# Patient Record
Sex: MALE | Race: WHITE | Employment: OTHER | URBAN - METROPOLITAN AREA
[De-identification: names, ages, dates, MRNs, and addresses within clinical notes are randomized per-mention and may not be internally consistent; named-entity substitution may affect disease eponyms.]

---

## 2017-01-09 ENCOUNTER — PRE VISIT (OUTPATIENT)
Dept: UROLOGY | Facility: CLINIC | Age: 72
End: 2017-01-09

## 2017-01-16 ENCOUNTER — OFFICE VISIT (OUTPATIENT)
Dept: UROLOGY | Facility: CLINIC | Age: 72
End: 2017-01-16

## 2017-01-16 VITALS
HEIGHT: 69 IN | HEART RATE: 102 BPM | BODY MASS INDEX: 32.84 KG/M2 | WEIGHT: 221.7 LBS | DIASTOLIC BLOOD PRESSURE: 90 MMHG | SYSTOLIC BLOOD PRESSURE: 148 MMHG

## 2017-01-16 DIAGNOSIS — N52.01 ERECTILE DYSFUNCTION DUE TO ARTERIAL INSUFFICIENCY: Primary | ICD-10-CM

## 2017-01-16 DIAGNOSIS — N52.01 ERECTILE DYSFUNCTION DUE TO ARTERIAL INSUFFICIENCY: ICD-10-CM

## 2017-01-16 ASSESSMENT — PAIN SCALES - GENERAL: PAINLEVEL: NO PAIN (0)

## 2017-01-16 NOTE — PATIENT INSTRUCTIONS
Dr. Weathers will follow up with results of lab test to check the testosterone.    The maximum dose of the sildenafil is 100 mg.

## 2017-01-16 NOTE — NURSING NOTE
"    Chief Complaint   Patient presents with     RECHECK     Follow up of libido, urinary frequency, medication follow up, and discuss A1C       Blood pressure 148/90, pulse 102, height 1.753 m (5' 9\"), weight 100.562 kg (221 lb 11.2 oz). Body mass index is 32.72 kg/(m^2).    Patient Active Problem List   Diagnosis     ED (erectile dysfunction)     Sciatic pain     Diabetes mellitus, type 2 (H)       No Known Allergies    Current Outpatient Prescriptions   Medication Sig Dispense Refill     lisinopril (PRINIVIL,ZESTRIL) 10 MG tablet Take 1 tablet (10 mg) by mouth daily 90 tablet 3     insulin pen needle (BD CAMPBELL U/F) 32G X 4 MM Use daily. 100 each 3     insulin pen needle 32G X 4 MM Use daily. 100 each 11     liraglutide (VICTOZA PEN) 18 MG/3ML soln Inject 1.2 mg SQ daily. 15 mL 3     vardenafil (LEVITRA) 20 MG tablet Take 1 tablet 30 - 60 min prior to planned activity 30 tablet 3     aspirin 81 MG tablet        glipiZIDE (GLUCOTROL) 10 MG tablet        Multiple Vitamin (MULTI VITAMIN DAILY PO)        oxyCODONE-acetaminophen (PERCOCET)  MG per tablet        simvastatin (ZOCOR) 40 MG tablet        metFORMIN (GLUCOPHAGE) 1000 MG tablet Take 1 tablet twice a day - take with breakfast and supper. 60 tablet 11       Social History   Substance Use Topics     Smoking status: Former Smoker     Quit date: 10/01/2015     Smokeless tobacco: Not on file     Alcohol Use: Not on file       ANAIS Dumont  1/16/2017  10:07 AM       "

## 2017-01-16 NOTE — MR AVS SNAPSHOT
After Visit Summary   1/16/2017    Diego Velarde    MRN: [id number]           Patient Information     Date Of Birth          1945        Visit Information        Provider Department      1/16/2017 10:20 AM Dieudonne Weathers MD [facility] Urology and [facility] for Prostate and Urologic Cancers        Today's Diagnoses     Erectile dysfunction due to arterial insufficiency    -  1       Care Instructions    Dr. Weathers will follow up with results of lab test to check the testosterone.    The maximum dose of the sildenafil is 100 mg.            Follow-ups after your visit        Follow-up notes from your care team     Return if symptoms worsen or fail to improve.      Your next 10 appointments already scheduled     Jan 16, 2017 11:15 AM   LAB with  LAB   [facility] Lab ([facility])    [address] [place] [address]  [address] [facility] [id number] [phone]           Patient must bring picture ID.  Patient should be prepared to give a urine specimen  Please do not eat 10-12 hours before your appointment if you are coming in fasting for labs on lipids, cholesterol, or glucose (sugar).  Pregnant women should follow their Care Team instructions. Water with medications is okay. Do not drink coffee or other fluids.   If you have concerns about taking  your medications, please ask at office or if scheduling via [employer], send a message by clicking on Secure Messaging, Message Your Care Team.            Feb 07, 2017  1:20 PM   (Arrive by 1:05 PM)   Return Visit with  Uro Procedure Nurse   [facility] Urology and Inst for Prostate and Urologic Cancers ([facility])    [address] [place] [address]  [address] [facility] [id number] [phone]            Feb 15, 2017  9:00 AM   (Arrive by 8:30 AM)   RETURN DIABETES with Kim Almeida PA-C   [facility] Endocrinology ([facility])    [address] [place] [address]  [address] [facility]  [id number] [phone]              Future tests that were ordered for you today     Open Future Orders        Priority Expected Expires Ordered    Testosterone total Routine  1/16/2018 1/16/2017            Who to contact     Please call your clinic at [phone] to:    Ask questions about your health    Make or cancel appointments    Discuss your medicines    Learn about your test results    Speak to your doctor   If you have compliments or concerns about an experience at your clinic, or if you wish to file a complaint, please contact [facility] Patient Relations at [phone] or email us at [email or web address]         Additional Information About Your Visit        [employer] Information     [employer] gives you secure access to your electronic health record. If you see a primary care provider, you can also send messages to your care team and make appointments. If you have questions, please call your primary care clinic.  If you do not have a primary care provider, please call [phone] and they will assist you.      [employer] is an electronic gateway that provides easy, online access to your medical records. With [employer], you can request a clinic appointment, read your test results, renew a prescription or communicate with your care team.     To access your existing account, please contact your [facility] or call [phone] for assistance.        Care EveryWhere ID     This is your Care EveryWhere ID. This could be used by other organizations to access your [facility] medical records  [id number]        Your Vitals Were     Pulse Height BMI (Body Mass Index)             102 1.753 m (5' 9\") 32.72 kg/m2          Blood Pressure from Last 3 Encounters:   01/16/17 148/90   11/17/16 130/75   08/11/16 152/89    Weight from Last 3 Encounters:   01/16/17 100.562 kg (221 lb 11.2 oz)   11/17/16 101.016 kg (222 lb 11.2 oz)   08/11/16 99.791 kg (220 lb) "               Primary Care Provider    None Specified       No primary provider on file.        Thank you!     Thank you for choosing [facility] UROLOGY AND [facility] FOR PROSTATE AND UROLOGIC CANCERS  for your care. Our goal is always to provide you with excellent care. Hearing back from our patients is one way we can continue to improve our services. Please take a few minutes to complete the written survey that you may receive in the mail after your visit with us. Thank you!             Your Updated Medication List - Protect others around you: Learn how to safely use, store and throw away your medicines at www.disposemymeds.org.          This list is accurate as of: 1/16/17 10:40 AM.  Always use your most recent med list.                   Brand Name Dispense Instructions for use    aspirin 81 MG tablet          glipiZIDE 10 MG tablet    GLUCOTROL         * insulin pen needle 32G X 4 MM     100 each    Use daily.       * insulin pen needle 32G X 4 MM    BD CAMPBELL U/F    100 each    Use daily.       lisinopril 10 MG tablet    PRINIVIL/ZESTRIL    90 tablet    Take 1 tablet (10 mg) by mouth daily       metFORMIN 1000 MG tablet    GLUCOPHAGE    60 tablet    Take 1 tablet twice a day - take with breakfast and supper.       MULTI VITAMIN DAILY PO          oxyCODONE-acetaminophen  MG per tablet    PERCOCET         simvastatin 40 MG tablet    ZOCOR         vardenafil 20 MG tablet    LEVITRA    30 tablet    Take 1 tablet 30 - 60 min prior to planned activity       VICTOZA PEN 18 MG/3ML soln   Generic drug:  liraglutide     15 mL    Inject 1.2 mg SQ daily.       * Notice:  This list has 2 medication(s) that are the same as other medications prescribed for you. Read the directions carefully, and ask your doctor or other care provider to review them with you.

## 2017-01-16 NOTE — Clinical Note
1/16/2017       RE: Diego Velarde  [address] [place] [address]     Dear Colleague,    Thank you for referring your patient, Diego Velarde, to the [facility] UROLOGY AND INST FOR PROSTATE AND UROLOGIC CANCERS at [facility]. Please see a copy of my visit note below.    DIAGNOSIS:  A 71-year-old man with erectile dysfunction.      HISTORY OF PRESENT ILLNESS:  Mr. Velarde is here for a followup visit.  I had seen him initially back in May of last year for erectile dysfunction and type 2 diabetes.  Subsequently, we had followup with Kim Almeida in our Diabetes Clinic for his diabetes.  With regard to his erectile dysfunction when I had seen him last May, we had given him a prescription for Cialis 20 mg to try.  In the interim, he also had contacted us and wanted to try Levitra, so we had sent a prescription for Levitra at the 20 mg dose as well to try.      He returns to follow up with several issues.  He feels his libido is decreased.  He notes he has always been quite sexually active and he is concerned that his desire is not as strong as in the past.  He is  and his wife is supportive, but he wants to be able to have an active sexual life if possible.  He also feels his energy is somewhat decreased overall.  He does note that he retired since we last saw him, but he is trying to be quite active, going to a gym regularly to work including doing some weight training.      He has also noted some urinary frequency, he may get up 2-3 times a night to urinate and he goes frequently during the day.  He reports no straining with urination.  He does note that he tends to drink a lot of fluid including coffee during the day.      With regard to his erectile dysfunction, he is using phosphodiesterase inhibitors somewhat intermittently.  It sounds like he had a prescription for generic sildenafil, which he has been trying and he was wondering about the maximum dose of  this.  He is not sure of the dose of the pills he has right now but he has been using about 1-1/2 pills at a time and he thinks they might be the 20 mg dose.  It sounds like the phosphodiesterase works somewhat variably and are not always adequate.  He notes that if he takes higher doses of them sometimes he will get some backache and heartburn.  However, this would generally be tolerable if they were effective.      Otherwise, he feels pretty well.  He does not describe any exertional chest pain or anginal symptoms.  Appetite is good.  He does take oxycodone twice a day for chronic back pain.        His diabetes has been in good control, working with Kim Almeida, his last A1c was 6.7%.      MEDICATION:  His current medications are lisinopril.  He is taking 5 mg tablets Kim recommended he cut 10 mg but apparently he did not change yet.  Liraglutide at lag 1.2 mg subcu daily.  Glipizide 10 mg daily.   Oxycodone twice a day as above, simvastatin 40 mg daily, Metformin 1000 mg twice a day.      REVIEW OF SYSTEMS:  As above, otherwise no acute cardiac, respiratory or other GI complaints.  He does have some sensation of decreased feeling in the lower extremities bilaterally.      PHYSICAL EXAM:     GENERAL:  He appears well.  He appears normally virilized.     VITAL SIGNS:  Weight is 221 pounds, little change since the last time I saw him last May, pulse 102, blood pressure 148/90.   HEENT:  Visual fields are full to confrontation.   NECK:  There is no thyromegaly.   BREASTS:  He has no gynecomastia.  He has normal hair distribution in a male pattern.   CHEST:  Clear.   CARDIAC:  Regular rate and rhythm, normal heart sounds.  Femoral pulses are intact without bruits.   GENITALIA:  Penis is circumcised, normally developed, there is no plaque palpable.  Testes are 15 mL bilaterally, normal size and consistency.  Normal anal sphincter tone.  Prostate feels smooth, nontender, no mass noted.  Does not feel significantly  enlarged.   EXTREMITIES:  He has no edema.      LABORATORY TESTS:  We reviewed his recent records.  Laboratory tests last August showed a creatinine 0.84.  Normal liver function tests normal.  TSH of 1.02, last hemoglobin A1c in November was 6.7%.      ASSESSMENT:  A 71-year-old man with erectile dysfunction.  The patient has multiple risk factors including age, hypertension, type 2 diabetes.  The patient also now complains of low libido.  He is having some lower urinary tract symptoms of urinary frequency.      I discussed with Mr. Velarde that we can certainly check a testosterone level.  Overall, he appears normally virilized, so my suspicion that he has severe hypogonadism as the etiology of his low libido is low.  Other more non-specific symptoms such as general decrease in energy are likely not related to his testosterone status.      With regard to his erectile dysfunction, we discussed options.  I told him the maximum dose of the sildenafil would be 100 mg so he could gradually go up to the maximum dose if necessary.  We discussed other options such as vacuum device or penile injection therapy.  He would be potentially interested in a vacuum device.  We did discuss that this is the most cost effective in the long run.      With regard to his lower urinary tract symptoms his prostate does not feel significantly enlarged.  I reassured him about this.  I did discuss that there were medications that can be used, which might help the nocturia, however at this point he would be fine just trying conservative measures such as limiting the amount of fluid that he takes it later in the evening.      PLAN:  After discussion, we agreed on the following plan.   1.  We are going to check a testosterone level today just to rule out significant hypogonadism that may be contributing to his symptoms, particularly the low libido.   2.  We will schedule him to come in and meet with a representative from a company that makes the  vacuum device to get information and a potential trial of the vacuum erection device.   3.  I wrote for him the maximum dose of the sildenafil so he can have that to refer to if he wants to increase his dose at home.   4.  I recommended that he go ahead and increase the lisinopril up to 10 mg as Kim Almeida had recommended.      We will follow up with him on the results testosterone level.  We will plan to see him back as necessary in the future depending on the results of his evaluation above.           Again, thank you for allowing me to participate in the care of your patient.      Sincerely,    Dieudonne Weathers MD

## 2017-01-16 NOTE — PROGRESS NOTES
DIAGNOSIS:  A 71-year-old man with erectile dysfunction.      HISTORY OF PRESENT ILLNESS:  Mr. Velarde is here for a followup visit.  I had seen him initially back in May of last year for erectile dysfunction and type 2 diabetes.  Subsequently, we had followup with Kim Almeida in our Diabetes Clinic for his diabetes.  With regard to his erectile dysfunction when I had seen him last May, we had given him a prescription for Cialis 20 mg to try.  In the interim, he also had contacted us and wanted to try Levitra, so we had sent a prescription for Levitra at the 20 mg dose as well to try.      He returns to follow up with several issues.  He feels his libido is decreased.  He notes he has always been quite sexually active and he is concerned that his desire is not as strong as in the past.  He is  and his wife is supportive, but he wants to be able to have an active sexual life if possible.  He also feels his energy is somewhat decreased overall.  He does note that he retired since we last saw him, but he is trying to be quite active, going to a gym regularly to work including doing some weight training.      He has also noted some urinary frequency, he may get up 2-3 times a night to urinate and he goes frequently during the day.  He reports no straining with urination.  He does note that he tends to drink a lot of fluid including coffee during the day.      With regard to his erectile dysfunction, he is using phosphodiesterase inhibitors somewhat intermittently.  It sounds like he had a prescription for generic sildenafil, which he has been trying and he was wondering about the maximum dose of this.  He is not sure of the dose of the pills he has right now but he has been using about 1-1/2 pills at a time and he thinks they might be the 20 mg dose.  It sounds like the phosphodiesterase works somewhat variably and are not always adequate.  He notes that if he takes higher doses of them sometimes he will get some  backache and heartburn.  However, this would generally be tolerable if they were effective.      Otherwise, he feels pretty well.  He does not describe any exertional chest pain or anginal symptoms.  Appetite is good.  He does take oxycodone twice a day for chronic back pain.        His diabetes has been in good control, working with Kim Almeida, his last A1c was 6.7%.      MEDICATION:  His current medications are lisinopril.  He is taking 5 mg tablets Kim recommended he cut 10 mg but apparently he did not change yet.  Liraglutide at lag 1.2 mg subcu daily.  Glipizide 10 mg daily.   Oxycodone twice a day as above, simvastatin 40 mg daily, Metformin 1000 mg twice a day.      REVIEW OF SYSTEMS:  As above, otherwise no acute cardiac, respiratory or other GI complaints.  He does have some sensation of decreased feeling in the lower extremities bilaterally.      PHYSICAL EXAM:     GENERAL:  He appears well.  He appears normally virilized.     VITAL SIGNS:  Weight is 221 pounds, little change since the last time I saw him last May, pulse 102, blood pressure 148/90.   HEENT:  Visual fields are full to confrontation.   NECK:  There is no thyromegaly.   BREASTS:  He has no gynecomastia.  He has normal hair distribution in a male pattern.   CHEST:  Clear.   CARDIAC:  Regular rate and rhythm, normal heart sounds.  Femoral pulses are intact without bruits.   GENITALIA:  Penis is circumcised, normally developed, there is no plaque palpable.  Testes are 15 mL bilaterally, normal size and consistency.  Normal anal sphincter tone.  Prostate feels smooth, nontender, no mass noted.  Does not feel significantly enlarged.   EXTREMITIES:  He has no edema.      LABORATORY TESTS:  We reviewed his recent records.  Laboratory tests last August showed a creatinine 0.84.  Normal liver function tests normal.  TSH of 1.02, last hemoglobin A1c in November was 6.7%.      ASSESSMENT:  A 71-year-old man with erectile dysfunction.  The patient  has multiple risk factors including age, hypertension, type 2 diabetes.  The patient also now complains of low libido.  He is having some lower urinary tract symptoms of urinary frequency.      I discussed with Mr. Velarde that we can certainly check a testosterone level.  Overall, he appears normally virilized, so my suspicion that he has severe hypogonadism as the etiology of his low libido is low.  Other more non-specific symptoms such as general decrease in energy are likely not related to his testosterone status.      With regard to his erectile dysfunction, we discussed options.  I told him the maximum dose of the sildenafil would be 100 mg so he could gradually go up to the maximum dose if necessary.  We discussed other options such as vacuum device or penile injection therapy.  He would be potentially interested in a vacuum device.  We did discuss that this is the most cost effective in the long run.      With regard to his lower urinary tract symptoms his prostate does not feel significantly enlarged.  I reassured him about this.  I did discuss that there were medications that can be used, which might help the nocturia, however at this point he would be fine just trying conservative measures such as limiting the amount of fluid that he takes it later in the evening.      PLAN:  After discussion, we agreed on the following plan.   1.  We are going to check a testosterone level today just to rule out significant hypogonadism that may be contributing to his symptoms, particularly the low libido.   2.  We will schedule him to come in and meet with a representative from a company that makes the vacuum device to get information and a potential trial of the vacuum erection device.   3.  I wrote for him the maximum dose of the sildenafil so he can have that to refer to if he wants to increase his dose at home.   4.  I recommended that he go ahead and increase the lisinopril up to 10 mg as Kim Almeida had  recommended.      We will follow up with him on the results testosterone level.  We will plan to see him back as necessary in the future depending on the results of his evaluation above.

## 2017-01-18 LAB — TESTOST SERPL-MCNC: 208 NG/DL (ref 240–950)

## 2017-01-23 ENCOUNTER — MYC MEDICAL ADVICE (OUTPATIENT)
Dept: UROLOGY | Facility: CLINIC | Age: 72
End: 2017-01-23

## 2017-01-23 DIAGNOSIS — E29.1 HYPOGONADISM MALE: ICD-10-CM

## 2017-01-23 DIAGNOSIS — R68.82 LOW LIBIDO: ICD-10-CM

## 2017-01-23 DIAGNOSIS — R89.9 ABNORMAL LABORATORY TEST: Primary | ICD-10-CM

## 2017-01-24 ENCOUNTER — MYC MEDICAL ADVICE (OUTPATIENT)
Dept: UROLOGY | Facility: CLINIC | Age: 72
End: 2017-01-24

## 2017-01-26 DIAGNOSIS — E29.1 HYPOGONADISM MALE: ICD-10-CM

## 2017-01-26 DIAGNOSIS — R68.82 LOW LIBIDO: ICD-10-CM

## 2017-01-26 DIAGNOSIS — R89.9 ABNORMAL LABORATORY TEST: ICD-10-CM

## 2017-01-26 LAB
FERRITIN SERPL-MCNC: 255 NG/ML (ref 26–388)
LH SERPL-ACNC: 9.8 IU/L (ref 3.1–34.6)
PROLACTIN SERPL-MCNC: 6 UG/L (ref 2–18)
SHBG SERPL-SCNC: 20 NMOL/L (ref 11–80)

## 2017-01-29 LAB — TESTOST SERPL-MCNC: 197 NG/DL (ref 240–950)

## 2017-02-03 ENCOUNTER — TELEPHONE (OUTPATIENT)
Dept: UROLOGY | Facility: CLINIC | Age: 72
End: 2017-02-03

## 2017-02-03 NOTE — TELEPHONE ENCOUNTER
----- Message from Jaclyn Cortez sent at 2/3/2017  9:30 AM CST -----  Patient scheduled and LM to confirm  ----- Message -----     From: Kasia Gustafson, DEBRA     Sent: 2/2/2017   3:57 PM       To: Clinic Coordinators-[facility]    Please schedule this pt to see Dr Weathers in a few weeks     an add on to the end of my clinic if nec.    Thanks Br    ----- Message -----     From: Dieudonne Weathers MD     Sent: 2/1/2017   9:10 AM       To: Kasia Gustafson, RN      Can we set him up to see me in the next couple of weeks - can add on to the end of my clinic if nec.  Thanks Br

## 2017-02-15 ENCOUNTER — PRE VISIT (OUTPATIENT)
Dept: ORTHOPEDICS | Facility: CLINIC | Age: 72
End: 2017-02-15

## 2017-02-15 ENCOUNTER — OFFICE VISIT (OUTPATIENT)
Dept: ENDOCRINOLOGY | Facility: CLINIC | Age: 72
End: 2017-02-15

## 2017-02-15 VITALS
WEIGHT: 226.2 LBS | HEART RATE: 76 BPM | SYSTOLIC BLOOD PRESSURE: 172 MMHG | HEIGHT: 69 IN | DIASTOLIC BLOOD PRESSURE: 92 MMHG | BODY MASS INDEX: 33.5 KG/M2

## 2017-02-15 DIAGNOSIS — Z79.4 TYPE 2 DIABETES MELLITUS WITH HYPERGLYCEMIA, WITH LONG-TERM CURRENT USE OF INSULIN (H): ICD-10-CM

## 2017-02-15 DIAGNOSIS — E11.65 TYPE 2 DIABETES MELLITUS WITH HYPERGLYCEMIA, WITH LONG-TERM CURRENT USE OF INSULIN (H): ICD-10-CM

## 2017-02-15 DIAGNOSIS — E11.65 TYPE 2 DIABETES MELLITUS WITH HYPERGLYCEMIA, WITH LONG-TERM CURRENT USE OF INSULIN (H): Primary | ICD-10-CM

## 2017-02-15 DIAGNOSIS — Z79.4 TYPE 2 DIABETES MELLITUS WITH HYPERGLYCEMIA, WITH LONG-TERM CURRENT USE OF INSULIN (H): Primary | ICD-10-CM

## 2017-02-15 LAB
ALT SERPL W P-5'-P-CCNC: 42 U/L (ref 0–70)
CHOLEST SERPL-MCNC: 109 MG/DL
CREAT UR-MCNC: 46 MG/DL
HBA1C MFR BLD: 7.1 % (ref 4.3–6)
HDLC SERPL-MCNC: 32 MG/DL
LDLC SERPL CALC-MCNC: 6 MG/DL
MICROALBUMIN UR-MCNC: 33 MG/L
MICROALBUMIN/CREAT UR: 71.71 MG/G CR (ref 0–17)
NONHDLC SERPL-MCNC: 77 MG/DL
POTASSIUM SERPL-SCNC: 4.3 MMOL/L (ref 3.4–5.3)
T4 FREE SERPL-MCNC: 0.92 NG/DL (ref 0.76–1.46)
TRIGL SERPL-MCNC: 358 MG/DL
TSH SERPL DL<=0.05 MIU/L-ACNC: 1.36 MU/L (ref 0.4–4)

## 2017-02-15 RX ORDER — PREGABALIN 50 MG/1
50 CAPSULE ORAL DAILY
Qty: 90 CAPSULE | Refills: 1 | Status: SHIPPED | OUTPATIENT
Start: 2017-02-15 | End: 2017-04-19

## 2017-02-15 RX ORDER — LIRAGLUTIDE 6 MG/ML
1.8 INJECTION SUBCUTANEOUS DAILY
Qty: 15 ML | Refills: 3 | Status: SHIPPED | OUTPATIENT
Start: 2017-02-15 | End: 2017-06-22

## 2017-02-15 NOTE — MR AVS SNAPSHOT
After Visit Summary   2/15/2017    Diego Velarde    MRN: [id number]           Patient Information     Date Of Birth          1945        Visit Information        Provider Department      2/15/2017 9:00 AM Kim Almeida PA-C [facility]        Today's Diagnoses     Type 2 diabetes mellitus with hyperglycemia, with long-term current use of insulin (H)    -  1      Care Instructions    1. Purchase a blood pressure kit at [facility] with cuff and check your BP at home in the am before working out and once in the evening.  Send me BP readings via [employer] next Wednesday.  2.  Increase Victoza 1.8 mg SQ daily.  3.  Continue Metformin and Glucotrol.  4.  See Podiatrist.  5.  See me in 2 months.  Kim Almeida PA-C         Follow-ups after your visit        Additional Services     PODIATRY/FOOT & ANKLE SURGERY REFERRAL       Your provider has referred you to: PREFERRED PROVIDERS:  Please schedule pt to see Podiatry for foot/nail care.    Please be aware that coverage of these services is subject to the terms and limitations of your health insurance plan.  Call member services at your health plan with any benefit or coverage questions.      Please bring the following to your appointment:  >>   Any x-rays, CTs or MRIs which have been performed.  Contact the facility where they were done to arrange for  prior to your scheduled appointment.    >>   List of current medications   >>   This referral request   >>   Any documents/labs given to you for this referral                  Your next 10 appointments already scheduled     Feb 27, 2017 10:40 AM CST   (Arrive by 10:25 AM)   Return Visit with Dieudonne Weathers MD   [facility]rologic C[facility] ([facility])    [address] [place] [id number] [phone]            Apr 19, 2017 10:40 AM CDT   (Arrive by 10:25 AM)   NEW FOOT/ANKLE with Rodríguez Serna  ANAT Simmons   [facility] ([facility] [place])    [address]  4th Floor  [facility] [address] [phone]            Apr 19, 2017 11:30 AM CDT   (Arrive by 11:15 AM)   RETURN DIABETES with Kim Almeida PA-C   [facility] ([facility])    [address]  3rd Floor  [facility] [address] [phone]              Future tests that were ordered for you today     Open Future Orders        Priority Expected Expires Ordered    Creatinine Routine  2/15/2018 2/15/2017            Who to contact     Please call your clinic at [phone] to:    Ask questions about your health    Make or cancel appointments    Discuss your medicines    Learn about your test results    Speak to your doctor   If you have compliments or concerns about an experience at your clinic, or if you wish to file a complaint, please contact [facility] Patient Relations at [phone] or email us at [email or web address]         Additional Information About Your Visit        [employer] Information     [email or web address] gives you secure access to your electronic health record. If you see a primary care provider, you can also send messages to your care team and make appointments. If you have questions, please call your primary care clinic.  If you do not have a primary care provider, please call [phone] and they will assist you.      EyeVerify is an electronic gateway that provides easy, online access to your medical records. With EyeVerify, you can request a clinic appointment, read your test results, renew a prescription or communicate with your care team.     To access your existing account, please contact your [facility] or call [phone] for assistance.        Care EveryWhere ID     This is your Care EveryWhere ID. This could be used by other organizations to access your [facility]  "records  [id number]        Your Vitals Were     Pulse Height BMI (Body Mass Index)             76 1.753 m (5' 9\") 33.4 kg/m2          Blood Pressure from Last 3 Encounters:   02/15/17 (P) 159/82   01/16/17 148/90   11/17/16 130/75    Weight from Last 3 Encounters:   02/15/17 102.6 kg (226 lb 3.2 oz)   01/16/17 100.6 kg (221 lb 11.2 oz)   11/17/16 101 kg (222 lb 11.2 oz)              We Performed the Following     Albumin Random Urine Quantitative     Hemoglobin A1c POCT     PODIATRY/FOOT & ANKLE SURGERY REFERRAL          Today's Medication Changes          These changes are accurate as of: 2/15/17  4:47 PM.  If you have any questions, ask your nurse or doctor.               Start taking these medicines.        Dose/Directions    pregabalin 50 MG capsule   Commonly known as:  LYRICA   Used for:  Type 2 diabetes mellitus with hyperglycemia, with long-term current use of insulin (H)   Started by:  Kim Almeida PA-C        Dose:  50 mg   Take 1 capsule (50 mg) by mouth daily   Quantity:  90 capsule   Refills:  1         These medicines have changed or have updated prescriptions.        Dose/Directions    liraglutide 18 MG/3ML soln   Commonly known as:  VICTOZA PEN   This may have changed:    - how much to take  - how to take this  - when to take this  - additional instructions   Used for:  Type 2 diabetes mellitus with hyperglycemia, with long-term current use of insulin (H)   Changed by:  Kim Almeida PA-C        Dose:  1.8 mg   Inject 1.8 mg Subcutaneous daily   Quantity:  15 mL   Refills:  3            Where to get your medicines      These medications were sent to [facility]/pharmacy #[id number] [place] [address] [address] [address]     Phone:  [phone]     liraglutide 18 MG/3ML soln         Some of these will need a paper prescription and others can be bought over the counter.  Ask your nurse if you have questions.     Bring a paper prescription for each " of these medications     pregabalin 50 MG capsule                Primary Care Provider Office Phone # Fax #    Sharmila Sauceda [phone]       [facility] [address] [facility] [address]        Thank you!     Thank you for choosing [facility]  for your care. Our goal is always to provide you with excellent care. Hearing back from our patients is one way we can continue to improve our services. Please take a few minutes to complete the written survey that you may receive in the mail after your visit with us. Thank you!             Your Updated Medication List - Protect others around you: Learn how to safely use, store and throw away your medicines at www.disposemymeds.org.          This list is accurate as of: 2/15/17  4:47 PM.  Always use your most recent med list.                   Brand Name Dispense Instructions for use    aspirin 81 MG tablet          glipiZIDE 10 MG tablet    GLUCOTROL         * insulin pen needle 32G X 4 MM     100 each    Use daily.       * insulin pen needle 32G X 4 MM    BD CAMPBELL U/F    100 each    Use daily.       liraglutide 18 MG/3ML soln    VICTOZA PEN    15 mL    Inject 1.8 mg Subcutaneous daily       lisinopril 10 MG tablet    PRINIVIL/ZESTRIL    90 tablet    Take 1 tablet (10 mg) by mouth daily       metFORMIN 1000 MG tablet    GLUCOPHAGE    60 tablet    Take 1 tablet twice a day - take with breakfast and supper.       MULTI VITAMIN DAILY PO          oxyCODONE-acetaminophen  MG per tablet    PERCOCET         pregabalin 50 MG capsule    LYRICA    90 capsule    Take 1 capsule (50 mg) by mouth daily       simvastatin 40 MG tablet    ZOCOR         vardenafil 20 MG tablet    LEVITRA    30 tablet    Take 1 tablet 30 - 60 min prior to planned activity       * Notice:  This list has 2 medication(s) that are the same as other medications prescribed for you. Read the directions carefully, and ask your doctor or other care provider to review  them with you.

## 2017-02-15 NOTE — PATIENT INSTRUCTIONS
1. Purchase a blood pressure kit at [facility] with cuff and check your BP at home in the am before working out and once in the evening.  Send me BP readings via [employer] next Wednesday.  2.  Increase Victoza 1.8 mg SQ daily.  3.  Continue Metformin and Glucotrol.  4.  See Podiatrist.  5.  See me in 2 months.  Kim Almeida PA-C

## 2017-02-15 NOTE — NURSING NOTE
"Chief Complaint   Patient presents with     RECHECK     f/u DM type 2        Initial BP (!) 172/92 (BP Location: Right arm, Patient Position: Chair, Cuff Size: Adult Large)  Pulse 76  Ht 1.753 m (5' 9\")  Wt 102.6 kg (226 lb 3.2 oz)  BMI 33.4 kg/m2 Estimated body mass index is 33.4 kg/(m^2) as calculated from the following:    Height as of this encounter: 1.753 m (5' 9\").    Weight as of this encounter: 102.6 kg (226 lb 3.2 oz).  Medication Reconciliation: complete         Sophy Starr, YAMILE     "

## 2017-02-15 NOTE — PROGRESS NOTES
HPI  Diego Tony Justin Velarde is a 71 year old male with type 2 diabetes mellitus here today for a follow up visit.  He was dx having type 2 diabetes 5 + years ago.  His diabetes is complicated by neuropathy and nephropathy.  No hx of retinopathy.  For his diabetes, he is currently taking Glucotrol 10 mg 2 tabs in pm,  Metformin 1000 mg BID and Victoza 1.2 mg SQ daily.  I started pt on Victoza on 7/22/2016 and he increased his Victoza dose 1.2 mg SQ daily on 7/29/2016.  He did not tolerate he higher dose of Victoza 1.8 mg daily.  His A1C is 7.1 % today.  His previous A1C was 6.7 %.    His  A1C was 8.1 % in May 2016.  We downloaded his glucose meter today and his fasting blood sugar values are higher.  His average glucose is 161 with SD 39 over the past month.  Several of his FBS values are in the mid-high 100 range.  No hypoglycemia.  On ROS today, numbness in his toes. No tingling or pain in his feet.  He has chronic back pain.  Pt denies visual problems.  He denies n/v, SOB at rest, cough, chest pain, abd pain or diarrhea.  No dysuria, hematuria or foot ulcers.    Diabetes Care  Retinopathy:none; pt seen by Oph in Dec 2016.  Nephropathy:yes; pt's urine microalbuminuria was + in 8/2016.  He is taking Lisinopril.  Neuropathy:yes.   Foot Exam:no ulcers; abnormal monofilamentous exam.  Taking aspirin:yes.  Lipids:LDL was 6 on 2/15/2017.Pt is taking Simvastatin.    ROS  Please see under HPI.    Allergies  No Known Allergies    Medications  Current Outpatient Prescriptions   Medication Sig Dispense Refill     liraglutide (VICTOZA PEN) 18 MG/3ML soln Inject 1.8 mg Subcutaneous daily 15 mL 3     pregabalin (LYRICA) 50 MG capsule Take 1 capsule (50 mg) by mouth daily 90 capsule 1     lisinopril (PRINIVIL,ZESTRIL) 10 MG tablet Take 1 tablet (10 mg) by mouth daily 90 tablet 3     insulin pen needle (BD CAMPBELL U/F) 32G X 4 MM Use daily. 100 each 3     insulin pen needle 32G X 4 MM Use daily. 100 each 11     vardenafil (LEVITRA)  "20 MG tablet Take 1 tablet 30 - 60 min prior to planned activity 30 tablet 3     aspirin 81 MG tablet        glipiZIDE (GLUCOTROL) 10 MG tablet        Multiple Vitamin (MULTI VITAMIN DAILY PO)        oxyCODONE-acetaminophen (PERCOCET)  MG per tablet        simvastatin (ZOCOR) 40 MG tablet        metFORMIN (GLUCOPHAGE) 1000 MG tablet Take 1 tablet twice a day - take with breakfast and supper. 60 tablet 11     [DISCONTINUED] liraglutide (VICTOZA PEN) 18 MG/3ML soln Inject 1.2 mg SQ daily. 15 mL 3     Family History  Father had type 2 diabetes.  He also has a brother with type 2 diabetes.    Social History  Smoke: none at this time; he quit smoking fall of 2015.  ETOH: rare.  .  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    Past Medical History  1.  Type 2 diabetes mellitus dx 5 + years ago.  2.  Neuropathy.  3.  S/P bilateral knee replacement.  4.  S/P left salivary cyst removed.  5.  S/P hernia surgery.  6.  Diverticulitis.  7.  HTN.  8. Hyperlipidemia.    Physical Exam  BP (P) 159/82  Pulse 76  Ht 1.753 m (5' 9\")  Wt 102.6 kg (226 lb 3.2 oz)  BMI 33.4 kg/m2  Body mass index is 33.4 kg/(m^2).    GENERAL : In no apparent distress  HEENT: PERRLA; fundi not examined.  NECK: No goiter.  LUNGS: Clear b/l.  CARDIAC: RRR.  ABDOMEN: Nontender.  EXTREMITIES: No edema.    FEET: No ulcers; hammertoes; abnormal monofilamentous exam.    RESULTS  A1C    7.1   2/15/2017  A1C    6.7   11/17/2016  A1C    7.2   8/11/2016  A1C    8.1 %  5/2016      ASSESSMENT/PLAN:    1.  TYPE 2 DIABETES MELLITUS:  Justin's A1C is higher today and his FBS values are above target.  I asked him to increase his Victoza 1.8 mg SQ daily.  If he develops n/v or abdominal pain he is to notify me.  Cristobal, I reviewed how Victoza works and possible side effects of the meds including n/v, GI distress, diarrhea, headaches, hypoglycemia and rare risk of pancreatitis/cancer and thyroid cancer.  He is to remain on Glucotrol and Metformin and if " he has any blood sugar values 70 or less to notify us.  Instructed pt to check his blood sugar premeals ( 3 times per day ) DAILY.  He was seen by Oph in 11/2016 without any evidence of retinopathy.  He is taking ASA daily.  TSH normal today.  Justin had the flu vaccine this season.    2. NEUROPATHY: Numbness in both feet. He tried Gabapentin which he states did not help.  I sent a RX for Lyrica to his pharmacy today.    3.  HTN: BP too high. He is taking Lisinopril 10 mg daily.  I asked him to purchase a BP cuff and check his BP each am prior to exercise and in the evening and to send me the BP readings via [employer] in 1 week.    4.  HYPERLIPIDEMIA: LDL 6 today on Simvastatin.  His TG were elevated today.    5.  OVERWEIGHT: Encouraged pt to make healthy food choices, reduce food portions with meals and remain active.  He has lost some weight.  Increasing Victoza dose 1.8 mg SQ daily.    6. ED: Pt to see Dr. Weathers in f/u next week.     7.  ED: Improved with better glycemic control.    8.  FOOT CARE: Referred to Podiatry here.  Pt has hammertoes and neuropathy.  He may benefit from being fitted with diabetic shoes/inserts.    9.  Return to Endocrine Clinic to see me in 2 months.

## 2017-02-15 NOTE — TELEPHONE ENCOUNTER
1.  Date/reason for appt: 4/19/17 - Diabetic Foot Care  2.  Referring provider: Kim Almeida PA-C  3.  Call to patient (Yes / No - short description): No, internal referral  4.  Previous care at / records requested from: [facility] -- Records and referral in Epic

## 2017-02-27 ENCOUNTER — OFFICE VISIT (OUTPATIENT)
Dept: UROLOGY | Facility: CLINIC | Age: 72
End: 2017-02-27

## 2017-02-27 VITALS
HEART RATE: 87 BPM | WEIGHT: 215 LBS | HEIGHT: 70 IN | BODY MASS INDEX: 30.78 KG/M2 | DIASTOLIC BLOOD PRESSURE: 84 MMHG | SYSTOLIC BLOOD PRESSURE: 150 MMHG

## 2017-02-27 DIAGNOSIS — E29.1 TESTICULAR HYPOGONADISM: Primary | ICD-10-CM

## 2017-02-27 RX ORDER — TESTOSTERONE 1.62 MG/G
GEL TRANSDERMAL
Qty: 75 G | Refills: 5 | Status: SHIPPED | OUTPATIENT
Start: 2017-02-27 | End: 2017-03-24

## 2017-02-27 ASSESSMENT — PAIN SCALES - GENERAL: PAINLEVEL: NO PAIN (0)

## 2017-02-27 NOTE — NURSING NOTE
"Chief Complaint   Patient presents with     RECHECK     Follow up- erectile dysfunction       Initial Ht 1.765 m (5' 9.5\")  Wt 97.5 kg (215 lb)  BMI 31.29 kg/m2 Estimated body mass index is 31.29 kg/(m^2) as calculated from the following:    Height as of this encounter: 1.765 m (5' 9.5\").    Weight as of this encounter: 97.5 kg (215 lb).  Medication Reconciliation: complete     ANAIS Escoto    "

## 2017-02-27 NOTE — MR AVS SNAPSHOT
After Visit Summary   2/27/2017    Diego Velarde    MRN: [id number]           Patient Information     Date Of Birth          1945        Visit Information        Provider Department      2/27/2017 10:40 AM Dieudonne Weathers MD [facility]rologic C[facility]        Today's Diagnoses     Testicular hypogonadism    -  1      Care Instructions    You can try the androgel 1 pump a day.    After you have been on the androgel for 3-4 weeks, come to the lab and we will recheck the level.  Be sure you apply he androgel as you usually do on the day you come for the labs.    Dr. Weathers will follow up with results of lab tests.    It was a pleasure meeting with you today.  Thank you for allowing me and my team the privilege of caring for you today.  YOU are the reason we are here, and I truly hope we provided you with the excellent service you deserve.  Please let us know if there is anything else we can do for you so that we can be sure you are leaving completely satisfied with your care experience.      ANAIS Escoto        Follow-ups after your visit        Follow-up notes from your care team     Return in about 3 months (around 5/27/2017).      Your next 10 appointments already scheduled     Apr 19, 2017 10:40 AM CDT   (Arrive by 10:25 AM)   NEW FOOT/ANKLE with Rodríguez Simmons DPM   [facility] ([facility] [place])    [address] [place] [id number] [phone]            Apr 19, 2017 11:30 AM CDT   (Arrive by 11:15 AM)   RETURN DIABETES with Kim Almeida PA-C   [facility] ([facility] [place])    [address] [place] [id number] [phone]              Future tests that were ordered for you today     Open Future Orders        Priority Expected Expires Ordered    Testosterone total Routine  2/27/2018 2/27/2017    Lutropin Routine  " 2/27/2018 2/27/2017    Hemoglobin Routine  2/27/2018 2/27/2017            Who to contact     Please call your clinic at [phone] to:    Ask questions about your health    Make or cancel appointments    Discuss your medicines    Learn about your test results    Speak to your doctor   If you have compliments or concerns about an experience at your clinic, or if you wish to file a complaint, please contact [facility] Patient Relations at [phone] or email us at [email or web address]         Additional Information About Your Visit        [employer] Information     Z[employer]a gives you secure access to your electronic health record. If you see a primary care provider, you can also send messages to your care team and make appointments. If you have questions, please call your primary care clinic.  If you do not have a primary care provider, please call [phone] and they will assist you.      Zoona is an electronic gateway that provides easy, online access to your medical records. With [employer]a, you can request a clinic appointment, read your test results, renew a prescription or communicate with your care team.     To access your existing account, please contact your [facility] or call [phone] for assistance.        Care EveryWhere ID     This is your Care EveryWhere ID. This could be used by other organizations to access your [place] medical records  [id number]        Your Vitals Were     Pulse Height BMI (Body Mass Index)             87 1.765 m (5' 9.5\") 31.29 kg/m2          Blood Pressure from Last 3 Encounters:   02/27/17 150/84   02/15/17 (P) 159/82   01/16/17 148/90    Weight from Last 3 Encounters:   02/27/17 97.5 kg (215 lb)   02/15/17 102.6 kg (226 lb 3.2 oz)   01/16/17 100.6 kg (221 lb 11.2 oz)                 Today's Medication Changes          These changes are accurate as of: 2/27/17 11:19 AM.  If you have any questions, ask " your nurse or doctor.               Start taking these medicines.        Dose/Directions    testosterone 20.25 MG/ACT gel   Commonly known as:  ANDROGEL 1.62% PUMP   Used for:  Testicular hypogonadism   Started by:  Dieudonne Weathers MD        Apply 1 pump a day to the upper arms and shoulders.   Quantity:  75 g   Refills:  5         Stop taking these medicines if you haven't already. Please contact your care team if you have questions.     vardenafil 20 MG tablet   Commonly known as:  LEVITRA   Stopped by:  Dieudonne Weathers MD                Where to get your medicines      Some of these will need a paper prescription and others can be bought over the counter.  Ask your nurse if you have questions.     Bring a paper prescription for each of these medications     testosterone 20.25 MG/ACT gel                Primary Care Provider Office Phone # Fax #    Sharmila Sauceda [phone]       [facility] [address] [address]        Thank you!     Thank you for choosing [facility] AND [facility] FOR PROSTATE AND UROLOGIC CANCERS  for your care. Our goal is always to provide you with excellent care. Hearing back from our patients is one way we can continue to improve our services. Please take a few minutes to complete the written survey that you may receive in the mail after your visit with us. Thank you!             Your Updated Medication List - Protect others around you: Learn how to safely use, store and throw away your medicines at www.disposemymeds.org.          This list is accurate as of: 2/27/17 11:19 AM.  Always use your most recent med list.                   Brand Name Dispense Instructions for use    aspirin 81 MG tablet          glipiZIDE 10 MG tablet    GLUCOTROL         * insulin pen needle 32G X 4 MM     100 each    Use daily.       * insulin pen needle 32G X 4 MM    BD CAMPBELL U/F    100 each    Use daily.       liraglutide 18 MG/3ML soln    VICTOZA PEN    15 mL     Inject 1.8 mg Subcutaneous daily       lisinopril 10 MG tablet    PRINIVIL/ZESTRIL    90 tablet    Take 1 tablet (10 mg) by mouth daily       metFORMIN 1000 MG tablet    GLUCOPHAGE    60 tablet    Take 1 tablet twice a day - take with breakfast and supper.       MULTI VITAMIN DAILY PO          oxyCODONE-acetaminophen  MG per tablet    PERCOCET         pregabalin 50 MG capsule    LYRICA    90 capsule    Take 1 capsule (50 mg) by mouth daily       simvastatin 40 MG tablet    ZOCOR         testosterone 20.25 MG/ACT gel    ANDROGEL 1.62% PUMP    75 g    Apply 1 pump a day to the upper arms and shoulders.       * Notice:  This list has 2 medication(s) that are the same as other medications prescribed for you. Read the directions carefully, and ask your doctor or other care provider to review them with you.

## 2017-02-27 NOTE — LETTER
2/27/2017       RE: Diego Velarde  [address] [place] [address]     Dear Colleague,    Thank you for referring your patient, Diego Velarde, to the [facility] UROLOGY AND INST FOR PROSTATE AND UROLOGIC CANCERS at [facility]. Please see a copy of my visit note below.    DIAGNOSIS:  A 71-year-old man with erectile dysfunction.  Hypogonadism with high normal LH levels suggesting some degree of testicular failure.      HISTORY OF PRESENT ILLNESS:  Mr. Velarde is here for a followup visit to discuss his testosterone results.  We had seen him in January for erectile dysfunction.  He also complains of decreased libido.  We discussed some treatment options with him for the erectile dysfunction including a vacuum erection device and phosphodiesterase inhibitors.      We also checked a testosterone level just to rule out significant hypogonadism, given the low libido.  His initial testosterone was 208.  We therefore repeated that and the repeat level was 197.  With that we also checked a prolactin which was normal at 6.  LH was 9.8, which was in the upper end of our normal range.  A ferritin level was 255, which was normal.      Based on this, we recommended the patient return to discuss an empiric trial of testosterone therapy.      I reviewed the testosterone results with him.  I told him his levels were in a range that it was difficult to know if they need be contributing to his sexual symptoms, but they might be affecting his libido.  Whether they would affect his erectile dysfunction or not I think it is more difficult to know.      I discussed with him that we could give him a trial of testosterone replacement for several months to see if there is symptomatic improvement in his sexual symptoms.  We discussed possible side effects including polycythemia which we can monitor.  After discussing things he would like to give a trial of testosterone replacement.      PHYSICAL  EXAMINATION:  On exam today he appears well.  His weight was 215 pounds, pulse 87, blood pressure 154/84.  He appears normally virilized.      LABORATORY TESTS:  We reviewed his past records.  Recent laboratory tests as above.      ASSESSMENT:  A 71-year-old man with erectile dysfunction, also complaints of low libido.  The patient has multiple risk factors for erectile dysfunction.  However, he does have a somewhat low total testosterone with a high normal LH suggesting there may be some degree of primary testicular failure.  I think given his symptoms, it would be reasonable to give him a trial of testosterone replacement therapy.      PLAN:   1.  We are going to give him a trial of AndroGel 1.62% 1 pump a day.  I went over the application with him and the possible side effects.   2.  We will plan to recheck a testosterone and hemoglobin level after he has been on the AndroGel for 3-4 weeks.  He can come into the lab to have that done.  We will follow up with him on the results and make any adjustments if necessary at that time.   3.  Otherwise, we will plan to see him back in 3 months to see if he is having any subjective benefit from the testosterone replacement.     Again, thank you for allowing me to participate in the care of your patient.      Sincerely,    Dieudonne Weathers MD

## 2017-02-27 NOTE — PATIENT INSTRUCTIONS
You can try the androgel 1 pump a day.    After you have been on the androgel for 3-4 weeks, come to the lab and we will recheck the level.  Be sure you apply he androgel as you usually do on the day you come for the labs.    Dr. Weathers will follow up with results of lab tests.    It was a pleasure meeting with you today.  Thank you for allowing me and my team the privilege of caring for you today.  YOU are the reason we are here, and I truly hope we provided you with the excellent service you deserve.  Please let us know if there is anything else we can do for you so that we can be sure you are leaving completely satisfied with your care experience.      ANAIS Escoto

## 2017-02-28 ENCOUNTER — TELEPHONE (OUTPATIENT)
Dept: UROLOGY | Facility: CLINIC | Age: 72
End: 2017-02-28

## 2017-02-28 NOTE — PROGRESS NOTES
DIAGNOSIS:  A 71-year-old man with erectile dysfunction.  Hypogonadism with high normal LH levels suggesting some degree of testicular failure.      HISTORY OF PRESENT ILLNESS:  Mr. Velarde is here for a followup visit to discuss his testosterone results.  We had seen him in January for erectile dysfunction.  He also complains of decreased libido.  We discussed some treatment options with him for the erectile dysfunction including a vacuum erection device and phosphodiesterase inhibitors.      We also checked a testosterone level just to rule out significant hypogonadism, given the low libido.  His initial testosterone was 208.  We therefore repeated that and the repeat level was 197.  With that we also checked a prolactin which was normal at 6.  LH was 9.8, which was in the upper end of our normal range.  A ferritin level was 255, which was normal.      Based on this, we recommended the patient return to discuss an empiric trial of testosterone therapy.      I reviewed the testosterone results with him.  I told him his levels were in a range that it was difficult to know if they need be contributing to his sexual symptoms, but they might be affecting his libido.  Whether they would affect his erectile dysfunction or not I think it is more difficult to know.      I discussed with him that we could give him a trial of testosterone replacement for several months to see if there is symptomatic improvement in his sexual symptoms.  We discussed possible side effects including polycythemia which we can monitor.  After discussing things he would like to give a trial of testosterone replacement.      PHYSICAL EXAMINATION:  On exam today he appears well.  His weight was 215 pounds, pulse 87, blood pressure 154/84.  He appears normally virilized.      LABORATORY TESTS:  We reviewed his past records.  Recent laboratory tests as above.      ASSESSMENT:  A 71-year-old man with erectile dysfunction, also complaints of low libido.   The patient has multiple risk factors for erectile dysfunction.  However, he does have a somewhat low total testosterone with a high normal LH suggesting there may be some degree of primary testicular failure.  I think given his symptoms, it would be reasonable to give him a trial of testosterone replacement therapy.      PLAN:   1.  We are going to give him a trial of AndroGel 1.62% 1 pump a day.  I went over the application with him and the possible side effects.   2.  We will plan to recheck a testosterone and hemoglobin level after he has been on the AndroGel for 3-4 weeks.  He can come into the lab to have that done.  We will follow up with him on the results and make any adjustments if necessary at that time.   3.  Otherwise, we will plan to see him back in 3 months to see if he is having any subjective benefit from the testosterone replacement.

## 2017-03-08 NOTE — TELEPHONE ENCOUNTER
[facility] Prior Authorization Team   Phone: [phone]  Fax: [phone]    PA Initiation    Medication: testosterone (ANDROGEL 1.62% PUMP) 20.25 MG/ACT gel  Insurance Company: [employer] Part D - Phone [phone] Fax [phone]  Pharmacy Filling the Rx: [id number] [place], [place] - [address] [place] [address]  Filling Pharmacy Phone: [phone]  Filling Pharmacy Fax: [phone]  Start Date: 3/8/2017

## 2017-03-09 DIAGNOSIS — I10 ESSENTIAL HYPERTENSION: Primary | ICD-10-CM

## 2017-03-09 RX ORDER — LISINOPRIL 40 MG/1
40 TABLET ORAL DAILY
Qty: 90 TABLET | Refills: 3 | Status: SHIPPED | OUTPATIENT
Start: 2017-03-09 | End: 2017-03-21

## 2017-03-09 NOTE — TELEPHONE ENCOUNTER
PRIOR AUTHORIZATION DENIED    Medication: testosterone (ANDROGEL 1.62% PUMP) 20.25 MG/ACT gel- DENIED    Denial Date: 3/9/2017    Denial Rational:     PA DENIED: Patient must try/fail formulary alternatives:  Formulary alternatives are Androderm (30 patches for 30 days - PA needed) and axiron (Quantity limit 440 mL per 30 days - PA needed).  If you feel there is additional information related to this case that might affect this decision and an appeal is desired, please submit a written letter of medical necessity to our PA Team.            Appeal Information:

## 2017-03-09 NOTE — TELEPHONE ENCOUNTER
Received question set stating that Androgel pump is non-formulary on the member's plan. Formulary alternatives are Androderm (30 patches for 30 days - PA needed) and axiron (440 mL per 30 days - PA needed) - would either of these formulary alternatives be effective? If so, please put a RX in the patients chart and the PA team will initiate a PA. If not, we will need to appeal when the denial comes and this will require a letter of medical necessity stating why the alternatives would not be effective. Please advise how you would like to proceed.

## 2017-03-21 DIAGNOSIS — I10 ESSENTIAL HYPERTENSION: Primary | ICD-10-CM

## 2017-03-21 RX ORDER — LOSARTAN POTASSIUM AND HYDROCHLOROTHIAZIDE 12.5; 5 MG/1; MG/1
1 TABLET ORAL DAILY
Qty: 90 TABLET | Refills: 3 | Status: SHIPPED | OUTPATIENT
Start: 2017-03-21 | End: 2017-04-19

## 2017-03-29 ENCOUNTER — TELEPHONE (OUTPATIENT)
Dept: UROLOGY | Facility: CLINIC | Age: 72
End: 2017-03-29

## 2017-04-05 ASSESSMENT — ENCOUNTER SYMPTOMS
HYPERTENSION: 1
HYPOTENSION: 0
DIFFICULTY URINATING: 1
HOARSE VOICE: 0
SMELL DISTURBANCE: 0
ORTHOPNEA: 0
PALPITATIONS: 0
DYSURIA: 0
NECK MASS: 0
TACHYCARDIA: 0
SLEEP DISTURBANCES DUE TO BREATHING: 0
SINUS PAIN: 0
HEMATURIA: 0
LEG SWELLING: 0
SORE THROAT: 0
LIGHT-HEADEDNESS: 0
TROUBLE SWALLOWING: 0
TASTE DISTURBANCE: 0
POOR WOUND HEALING: 0
SINUS CONGESTION: 0
LEG PAIN: 0
FLANK PAIN: 0
EXERCISE INTOLERANCE: 0
SYNCOPE: 0
CLAUDICATION: 0
NAIL CHANGES: 0
SKIN CHANGES: 0

## 2017-04-06 NOTE — TELEPHONE ENCOUNTER
[facility] Prior Authorization Team   Phone: [phone]  Fax: [phone]    PA Initiation    Medication: testosterone (AXIRON) 30 MG/ACT topical solution  Insurance Company: [employer] Part D - Phone [phone] Fax [phone]  Pharmacy Filling the Rx: [id number] - [place], [place] - [address] [place] [address]  Filling Pharmacy Phone: [phone]  Filling Pharmacy Fax:    Start Date: 4/6/2017

## 2017-04-07 NOTE — TELEPHONE ENCOUNTER
Prior Authorization Already on file    Authorization Effective Date:    Authorization Expiration Date:    Medication: testosterone (AXIRON) 30 MG/ACT topical solution - PA already on file  Approved Dose/Quantity:   Reference #:     Insurance Company: [employer] Part D - Phone [phone] Fax [phone]  Expected CoPay: $152.96     CoPay Card Available:      Foundation Assistance Needed:    Which Pharmacy is filling the prescription (Not needed for infusion/clinic administered): [id number] - [place], [place] - [address] [facility]  Pharmacy Notified: Yes  Patient Notified: Yes

## 2017-04-19 ENCOUNTER — TELEPHONE (OUTPATIENT)
Dept: ENDOCRINOLOGY | Facility: CLINIC | Age: 72
End: 2017-04-19

## 2017-04-19 ENCOUNTER — OFFICE VISIT (OUTPATIENT)
Dept: ENDOCRINOLOGY | Facility: CLINIC | Age: 72
End: 2017-04-19

## 2017-04-19 VITALS
DIASTOLIC BLOOD PRESSURE: 87 MMHG | HEART RATE: 92 BPM | SYSTOLIC BLOOD PRESSURE: 146 MMHG | HEIGHT: 69 IN | WEIGHT: 222.7 LBS | BODY MASS INDEX: 32.98 KG/M2

## 2017-04-19 DIAGNOSIS — Z79.4 TYPE 2 DIABETES MELLITUS WITH HYPERGLYCEMIA, WITH LONG-TERM CURRENT USE OF INSULIN (H): ICD-10-CM

## 2017-04-19 DIAGNOSIS — E11.65 TYPE 2 DIABETES MELLITUS WITH HYPERGLYCEMIA, WITH LONG-TERM CURRENT USE OF INSULIN (H): ICD-10-CM

## 2017-04-19 DIAGNOSIS — I10 BENIGN ESSENTIAL HYPERTENSION: Primary | ICD-10-CM

## 2017-04-19 DIAGNOSIS — I10 ESSENTIAL HYPERTENSION: ICD-10-CM

## 2017-04-19 LAB — HBA1C MFR BLD: 7.2 % (ref 4.3–6)

## 2017-04-19 RX ORDER — LOSARTAN POTASSIUM AND HYDROCHLOROTHIAZIDE 12.5; 1 MG/1; MG/1
1 TABLET ORAL DAILY
Qty: 90 TABLET | Refills: 3 | Status: SHIPPED | OUTPATIENT
Start: 2017-04-19 | End: 2017-05-02

## 2017-04-19 RX ORDER — PREGABALIN 50 MG/1
50 CAPSULE ORAL 2 TIMES DAILY
Qty: 90 CAPSULE | Refills: 1 | Status: SHIPPED | OUTPATIENT
Start: 2017-04-19 | End: 2017-07-19

## 2017-04-19 NOTE — PROGRESS NOTES
HPI  Diego Velarde is a 72 year old male with type 2 diabetes mellitus here today for a follow up visit.  Justin was dx having type 2 diabetes 5 + years ago.  His diabetes is complicated by neuropathy and nephropathy.  No hx of retinopathy.  For his diabetes, he is currently taking Glucotrol 10 mg 2 tabs in pm,  Metformin 1000 mg BID and Victoza 1.8 mg SQ daily.  His A1C is 7.2 % today.  His previous A1C was 7.1 %.  Justin's  A1C was 8.1 % in May 2016.  We downloaded his glucose meter today and his average glucose is 156 with SD 34 over the past month.  Several of his FBS values are in the [id number] range.  No hypoglycemia.  On ROS today, numbness in his toes. No tingling or pain in his feet.  He has chronic back pain.  Some heartburn.  Pt denies visual problems.  Justin denies n/v, SOB at rest, cough, chest pain, abd pain or diarrhea.  No dysuria, hematuria or foot ulcers.    Diabetes Care  Retinopathy:none; pt seen by Oph in Dec 2016.  Nephropathy:yes; pt's urine microalbuminuria was + in 2/2017.  He is taking Lisinopril.  Neuropathy:yes.   Foot Exam:no ulcers; abnormal monofilamentous exam.  Taking aspirin:yes.  Lipids:LDL was 6 on 2/15/2017.Pt is taking Simvastatin.    ROS  Please see under HPI.    Allergies  No Known Allergies    Medications  Current Outpatient Prescriptions   Medication Sig Dispense Refill     losartan-hydrochlorothiazide (HYZAAR) 100-12.5 MG per tablet Take 1 tablet by mouth daily 90 tablet 3     insulin pen needle (BD CAMPBELL U/F) 32G X 4 MM Use daily. 100 each 3     pregabalin (LYRICA) 50 MG capsule Take 1 capsule (50 mg) by mouth 2 times daily 90 capsule 1     testosterone (AXIRON) 30 MG/ACT topical solution Place 1 pump (30 mg) onto the skin daily Apply to underarm; apply deodorant at least 2 minutes before applying Axiron. 0.9 g 5     liraglutide (VICTOZA PEN) 18 MG/3ML soln Inject 1.8 mg Subcutaneous daily 15 mL 3     insulin pen needle 32G X 4 MM Use daily. 100 each 11     aspirin 81 MG  "tablet        glipiZIDE (GLUCOTROL) 10 MG tablet        Multiple Vitamin (MULTI VITAMIN DAILY PO)        oxyCODONE-acetaminophen (PERCOCET)  MG per tablet        simvastatin (ZOCOR) 40 MG tablet        metFORMIN (GLUCOPHAGE) 1000 MG tablet Take 1 tablet twice a day - take with breakfast and supper. 60 tablet 11     [DISCONTINUED] losartan-hydrochlorothiazide (HYZAAR) 50-12.5 MG per tablet Take 1 tablet by mouth daily 90 tablet 3     [DISCONTINUED] pregabalin (LYRICA) 50 MG capsule Take 1 capsule (50 mg) by mouth daily 90 capsule 1     Family History  Father had type 2 diabetes.  He also has a brother with type 2 diabetes.    Social History  Smoke: none at this time; he quit smoking fall of 2015.  ETOH: rare.  .  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    Past Medical History  1.  Type 2 diabetes mellitus dx 5 + years ago.  2.  Neuropathy.  3.  S/P bilateral knee replacement.  4.  S/P left salivary cyst removed.  5.  S/P hernia surgery.  6.  Diverticulitis.  7.  HTN.  8. Hyperlipidemia.    Physical Exam  /87 (BP Location: Right arm, Patient Position: Chair, Cuff Size: Adult Large)  Pulse 92  Ht 1.753 m (5' 9\")  Wt 101 kg (222 lb 11.2 oz)  BMI 32.89 kg/m2  Body mass index is 32.89 kg/(m^2).    GENERAL : In no apparent distress  HEENT: PERRLA; fundi not examined.  NECK: No goiter.  LUNGS: Clear b/l.  CARDIAC: RRR.  ABDOMEN: Nontender.  EXTREMITIES: No edema.    FEET: No ulcers; hammertoes; abnormal monofilamentous exam.    RESULTS  A1C    7.2   4/19/2017  A1C    7.1   2/15/2017  A1C    6.7   11/17/2016  A1C    7.2   8/11/2016  A1C    8.1 %  5/2016      ASSESSMENT/PLAN:    1.  TYPE 2 DIABETES MELLITUS:  Type 2 diabetes mellitus complicated by nephropathy and neuropathy.  Pt's A1C is reasonable at this time.  He is to remain on Victoza 1.8 mg SQ daily.  If he develops n/v or abdominal pain he is to notify me.  Again, I reviewed how Victoza works and possible side effects of the meds " including n/v, GI distress, diarrhea, headaches, hypoglycemia and rare risk of pancreatitis/cancer and thyroid cancer.  He is to remain on Glucotrol and Metformin and if he has any blood sugar values 70 or less to notify us.  Instructed pt to check his blood sugar premeals ( 3 times per day ) DAILY.  He was seen by Oph in 12/2016 without any evidence of retinopathy.  He is taking ASA daily.  TSH normal in Feb 2017.    2. NEUROPATHY: Numbness in both feet. He tried Gabapentin which he states did not help.  I sent a RX for Lyrica to his pharmacy again today.    3.  HTN: BP too high.   Increase Hyzaar 100-12.5 mg daily.    4.  HYPERLIPIDEMIA: LDL 6 in 2/2017on Simvastatin.  Hx of elevated TG.    5.  OVERWEIGHT: Encouraged pt to make healthy food choices, reduce food portions with meals and remain active.  He has lost some weight.  Increasing Victoza dose 1.8 mg SQ daily.    6. ED: Pt to see Dr. Weathers in follow up. Labs ordered.     7.  FOOT CARE: Referred to Podiatry here.  Pt has hammertoes and neuropathy.  He may benefit from being fitted with diabetic shoes/inserts.     8.  Return to Endocrine Clinic to see me in 3 months.

## 2017-04-19 NOTE — NURSING NOTE
"Chief Complaint   Patient presents with     RECHECK     DIABETES TYPE 2 F/U        Initial /87 (BP Location: Right arm, Patient Position: Chair, Cuff Size: Adult Large)  Pulse 92  Ht 1.753 m (5' 9\")  Wt 101 kg (222 lb 11.2 oz)  BMI 32.89 kg/m2 Estimated body mass index is 32.89 kg/(m^2) as calculated from the following:    Height as of this encounter: 1.753 m (5' 9\").    Weight as of this encounter: 101 kg (222 lb 11.2 oz).  Medication Reconciliation: complete         Sophy Starr CMA     "

## 2017-04-19 NOTE — LETTER
4/19/2017       RE: Diego Velarde  [address] [place] [address]     Dear Colleague,    Thank you for referring your patient, Diego Velarde, to the [facility] ENDOCRINOLOGY at [facility]. Please see a copy of my visit note below.    HPI  Diego Velarde is a 72 year old male with type 2 diabetes mellitus here today for a follow up visit.  Justin was dx having type 2 diabetes 5 + years ago.  His diabetes is complicated by neuropathy and nephropathy.  No hx of retinopathy.  For his diabetes, he is currently taking Glucotrol 10 mg 2 tabs in pm,  Metformin 1000 mg BID and Victoza 1.8 mg SQ daily.  His A1C is 7.2 % today.  His previous A1C was 7.1 %.  Justin's  A1C was 8.1 % in May 2016.  We downloaded his glucose meter today and his average glucose is 156 with SD 34 over the past month.  Several of his FBS values are in the [id number] range.  No hypoglycemia.  On ROS today, numbness in his toes. No tingling or pain in his feet.  He has chronic back pain.  Some heartburn.  Pt denies visual problems.  Justin denies n/v, SOB at rest, cough, chest pain, abd pain or diarrhea.  No dysuria, hematuria or foot ulcers.    Diabetes Care  Retinopathy:none; pt seen by Oph in Dec 2016.  Nephropathy:yes; pt's urine microalbuminuria was + in 2/2017.  He is taking Lisinopril.  Neuropathy:yes.   Foot Exam:no ulcers; abnormal monofilamentous exam.  Taking aspirin:yes.  Lipids:LDL was 6 on 2/15/2017.Pt is taking Simvastatin.    ROS  Please see under HPI.    Allergies  No Known Allergies    Medications  Current Outpatient Prescriptions   Medication Sig Dispense Refill     losartan-hydrochlorothiazide (HYZAAR) 100-12.5 MG per tablet Take 1 tablet by mouth daily 90 tablet 3     insulin pen needle (BD CAMPBELL U/F) 32G X 4 MM Use daily. 100 each 3     pregabalin (LYRICA) 50 MG capsule Take 1 capsule (50 mg) by mouth 2 times daily 90 capsule 1     testosterone (AXIRON) 30 MG/ACT topical solution Place 1  "pump (30 mg) onto the skin daily Apply to underarm; apply deodorant at least 2 minutes before applying Axiron. 0.9 g 5     liraglutide (VICTOZA PEN) 18 MG/3ML soln Inject 1.8 mg Subcutaneous daily 15 mL 3     insulin pen needle 32G X 4 MM Use daily. 100 each 11     aspirin 81 MG tablet        glipiZIDE (GLUCOTROL) 10 MG tablet        Multiple Vitamin (MULTI VITAMIN DAILY PO)        oxyCODONE-acetaminophen (PERCOCET)  MG per tablet        simvastatin (ZOCOR) 40 MG tablet        metFORMIN (GLUCOPHAGE) 1000 MG tablet Take 1 tablet twice a day - take with breakfast and supper. 60 tablet 11     [DISCONTINUED] losartan-hydrochlorothiazide (HYZAAR) 50-12.5 MG per tablet Take 1 tablet by mouth daily 90 tablet 3     [DISCONTINUED] pregabalin (LYRICA) 50 MG capsule Take 1 capsule (50 mg) by mouth daily 90 capsule 1     Family History  Father had type 2 diabetes.  He also has a brother with type 2 diabetes.    Social History  Smoke: none at this time; he quit smoking fall of 2015.  ETOH: rare.  .  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    Past Medical History  1.  Type 2 diabetes mellitus dx 5 + years ago.  2.  Neuropathy.  3.  S/P bilateral knee replacement.  4.  S/P left salivary cyst removed.  5.  S/P hernia surgery.  6.  Diverticulitis.  7.  HTN.  8. Hyperlipidemia.    Physical Exam  /87 (BP Location: Right arm, Patient Position: Chair, Cuff Size: Adult Large)  Pulse 92  Ht 1.753 m (5' 9\")  Wt 101 kg (222 lb 11.2 oz)  BMI 32.89 kg/m2  Body mass index is 32.89 kg/(m^2).    GENERAL : In no apparent distress  HEENT: PERRLA; fundi not examined.  NECK: No goiter.  LUNGS: Clear b/l.  CARDIAC: RRR.  ABDOMEN: Nontender.  EXTREMITIES: No edema.    FEET: No ulcers; hammertoes; abnormal monofilamentous exam.    RESULTS  A1C    7.2   4/19/2017  A1C    7.1   2/15/2017  A1C    6.7   11/17/2016  A1C    7.2   8/11/2016  A1C    8.1 %  5/2016      ASSESSMENT/PLAN:    1.  TYPE 2 DIABETES MELLITUS:  Type 2 " diabetes mellitus complicated by nephropathy and neuropathy.  Pt's A1C is reasonable at this time.  He is to remain on Victoza 1.8 mg SQ daily.  If he develops n/v or abdominal pain he is to notify me.  Again, I reviewed how Victoza works and possible side effects of the meds including n/v, GI distress, diarrhea, headaches, hypoglycemia and rare risk of pancreatitis/cancer and thyroid cancer.  He is to remain on Glucotrol and Metformin and if he has any blood sugar values 70 or less to notify us.  Instructed pt to check his blood sugar premeals ( 3 times per day ) DAILY.  He was seen by Oph in 12/2016 without any evidence of retinopathy.  He is taking ASA daily.  TSH normal in Feb 2017.    2. NEUROPATHY: Numbness in both feet. He tried Gabapentin which he states did not help.  I sent a RX for Lyrica to his pharmacy again today.    3.  HTN: BP too high.   Increase Hyzaar 100-12.5 mg daily.    4.  HYPERLIPIDEMIA: LDL 6 in 2/2017on Simvastatin.  Hx of elevated TG.    5.  OVERWEIGHT: Encouraged pt to make healthy food choices, reduce food portions with meals and remain active.  He has lost some weight.  Increasing Victoza dose 1.8 mg SQ daily.    6. ED: Pt to see Dr. Weathers in follow up. Labs ordered.     7.  FOOT CARE: Referred to Podiatry here.  Pt has hammertoes and neuropathy.  He may benefit from being fitted with diabetic shoes/inserts.     8.  Return to Endocrine Clinic to see me in 3 months.      Again, thank you for allowing me to participate in the care of your patient.      Sincerely,    Kim Almeida PA-C

## 2017-04-19 NOTE — MR AVS SNAPSHOT
After Visit Summary   4/19/2017    Diego Velarde    MRN: [id number]           Patient Information     Date Of Birth          1945        Visit Information        Provider Department      4/19/2017 11:30 AM Kim Almeida PA-C [facility]        Today's Diagnoses     Benign essential hypertension    -  1    Essential hypertension        Type 2 diabetes mellitus with hyperglycemia, with long-term current use of insulin (H)           Follow-ups after your visit        Your next 10 appointments already scheduled     Jul 19, 2017 10:30 AM CDT   (Arrive by 10:15 AM)   RETURN DIABETES with PAVEL Cnonell [facility] ([facility])    [address] [address] [phone]              Who to contact     Please call your clinic at [phone] to:    Ask questions about your health    Make or cancel appointments    Discuss your medicines    Learn about your test results    Speak to your doctor   If you have compliments or concerns about an experience at your clinic, or if you wish to file a complaint, please contact [facility] Patient Relations at [phone] or email us at [email or web address]         Additional Information About Your Visit        MyChart Information     [employer] gives you secure access to your electronic health record. If you see a primary care provider, you can also send messages to your care team and make appointments. If you have questions, please call your primary care clinic.  If you do not have a primary care provider, please call [phone] and they will assist you.      Photowhoa is an electronic gateway that provides easy, online access to your medical records. With [employer]a, you can request a clinic appointment, read your test results, renew a prescription or communicate with your care team.     To access your existing account, please  "contact your [facility] or call [phone] for assistance.        Care EveryWhere ID     This is your Care EveryWhere ID. This could be used by other organizations to access your [place] medical records  [id number]        Your Vitals Were     Pulse Height BMI (Body Mass Index)             92 1.753 m (5' 9\") 32.89 kg/m2          Blood Pressure from Last 3 Encounters:   04/19/17 146/87   02/27/17 150/84   02/15/17 (P) 159/82    Weight from Last 3 Encounters:   04/19/17 101 kg (222 lb 11.2 oz)   02/27/17 97.5 kg (215 lb)   02/15/17 102.6 kg (226 lb 3.2 oz)              Today, you had the following     No orders found for display         Today's Medication Changes          These changes are accurate as of: 4/19/17 12:50 PM.  If you have any questions, ask your nurse or doctor.               Start taking these medicines.        Dose/Directions    losartan-hydrochlorothiazide 100-12.5 MG per tablet   Commonly known as:  HYZAAR   Used for:  Benign essential hypertension   Replaces:  losartan-hydrochlorothiazide 50-12.5 MG per tablet        Dose:  1 tablet   Take 1 tablet by mouth daily   Quantity:  90 tablet   Refills:  3         These medicines have changed or have updated prescriptions.        Dose/Directions    pregabalin 50 MG capsule   Commonly known as:  LYRICA   This may have changed:  when to take this   Used for:  Type 2 diabetes mellitus with hyperglycemia, with long-term current use of insulin (H)        Dose:  50 mg   Take 1 capsule (50 mg) by mouth 2 times daily   Quantity:  90 capsule   Refills:  1         Stop taking these medicines if you haven't already. Please contact your care team if you have questions.     losartan-hydrochlorothiazide 50-12.5 MG per tablet   Commonly known as:  HYZAAR   Replaced by:  losartan-hydrochlorothiazide 100-12.5 MG per tablet                Where to get your medicines      These medications were sent to [facility]/pharmacy #[id number] - [place] - " [address] [facility]  [address] [place] [address]     Phone:  [phone]     insulin pen needle 32G X 4 MM    losartan-hydrochlorothiazide 100-12.5 MG per tablet         Some of these will need a paper prescription and others can be bought over the counter.  Ask your nurse if you have questions.     Bring a paper prescription for each of these medications     pregabalin 50 MG capsule                Primary Care Provider Office Phone # Fax #    Sharmila Sauceda [phone]       [facility] [address] [facility] [address]        Thank you!     Thank you for choosing [facility] ENDO[facility]IN[facility]  for your care. Our goal is always to provide you with excellent care. Hearing back from our patients is one way we can continue to improve our services. Please take a few minutes to complete the written survey that you may receive in the mail after your visit with us. Thank you!             Your Updated Medication List - Protect others around you: Learn how to safely use, store and throw away your medicines at www.disposemymeds.org.          This list is accurate as of: 4/19/17 12:50 PM.  Always use your most recent med list.                   Brand Name Dispense Instructions for use    aspirin 81 MG tablet          glipiZIDE 10 MG tablet    GLUCOTROL         * insulin pen needle 32G X 4 MM     100 each    Use daily.       * insulin pen needle 32G X 4 MM    BD CAMPBELL U/F    100 each    Use daily.       liraglutide 18 MG/3ML soln    VICTOZA PEN    15 mL    Inject 1.8 mg Subcutaneous daily       losartan-hydrochlorothiazide 100-12.5 MG per tablet    HYZAAR    90 tablet    Take 1 tablet by mouth daily       metFORMIN 1000 MG tablet    GLUCOPHAGE    60 tablet    Take 1 tablet twice a day - take with breakfast and supper.       MULTI VITAMIN DAILY PO          oxyCODONE-acetaminophen  MG per tablet    PERCOCET         pregabalin 50 MG capsule    LYRICA    90 capsule    Take 1 capsule  (50 mg) by mouth 2 times daily       simvastatin 40 MG tablet    ZOCOR         testosterone 30 MG/ACT topical solution    AXIRON    0.9 g    Place 1 pump (30 mg) onto the skin daily Apply to underarm; apply deodorant at least 2 minutes before applying Axiron.       * Notice:  This list has 2 medication(s) that are the same as other medications prescribed for you. Read the directions carefully, and ask your doctor or other care provider to review them with you.

## 2017-05-02 DIAGNOSIS — I10 ESSENTIAL HYPERTENSION: Primary | ICD-10-CM

## 2017-05-02 RX ORDER — LOSARTAN POTASSIUM AND HYDROCHLOROTHIAZIDE 25; 100 MG/1; MG/1
1 TABLET ORAL DAILY
Qty: 30 TABLET | Refills: 3 | Status: SHIPPED | OUTPATIENT
Start: 2017-05-02 | End: 2017-07-06

## 2017-05-09 DIAGNOSIS — E11.65 TYPE 2 DIABETES MELLITUS WITH HYPERGLYCEMIA, WITH LONG-TERM CURRENT USE OF INSULIN (H): ICD-10-CM

## 2017-05-09 DIAGNOSIS — Z79.4 TYPE 2 DIABETES MELLITUS WITH HYPERGLYCEMIA, WITH LONG-TERM CURRENT USE OF INSULIN (H): ICD-10-CM

## 2017-05-09 DIAGNOSIS — E29.1 TESTICULAR HYPOGONADISM: ICD-10-CM

## 2017-05-09 LAB
CREAT SERPL-MCNC: 0.87 MG/DL (ref 0.66–1.25)
GFR SERPL CREATININE-BSD FRML MDRD: 87 ML/MIN/1.7M2
HGB BLD-MCNC: 14 G/DL (ref 13.3–17.7)
LH SERPL-ACNC: 7.8 IU/L (ref 3.1–34.6)

## 2017-05-11 LAB — TESTOST SERPL-MCNC: 176 NG/DL (ref 240–950)

## 2017-05-15 ENCOUNTER — MYC MEDICAL ADVICE (OUTPATIENT)
Dept: UROLOGY | Facility: CLINIC | Age: 72
End: 2017-05-15

## 2017-05-15 DIAGNOSIS — E29.1 HYPOGONADISM MALE: ICD-10-CM

## 2017-05-15 RX ORDER — TESTOSTERONE 30 MG/1.5ML
2 SOLUTION TOPICAL DAILY
Qty: 1.8 G | Refills: 5 | Status: SHIPPED | OUTPATIENT
Start: 2017-05-15 | End: 2017-07-19

## 2017-05-17 ENCOUNTER — MYC MEDICAL ADVICE (OUTPATIENT)
Dept: ENDOCRINOLOGY | Facility: CLINIC | Age: 72
End: 2017-05-17

## 2017-06-05 DIAGNOSIS — E11.65 TYPE 2 DIABETES MELLITUS WITH HYPERGLYCEMIA, WITHOUT LONG-TERM CURRENT USE OF INSULIN (H): Primary | ICD-10-CM

## 2017-06-05 RX ORDER — GLIPIZIDE 10 MG/1
TABLET ORAL
Qty: 180 TABLET | Refills: 3 | COMMUNITY
Start: 2017-06-05 | End: 2017-06-06

## 2017-06-06 DIAGNOSIS — E11.65 TYPE 2 DIABETES MELLITUS WITH HYPERGLYCEMIA, WITHOUT LONG-TERM CURRENT USE OF INSULIN (H): ICD-10-CM

## 2017-06-06 RX ORDER — GLIPIZIDE 10 MG/1
TABLET ORAL
Qty: 180 TABLET | Refills: 3 | COMMUNITY
Start: 2017-06-06 | End: 2017-07-19

## 2017-07-06 DIAGNOSIS — I10 ESSENTIAL HYPERTENSION: ICD-10-CM

## 2017-07-06 RX ORDER — LOSARTAN POTASSIUM AND HYDROCHLOROTHIAZIDE 25; 100 MG/1; MG/1
1 TABLET ORAL DAILY
Qty: 90 TABLET | Refills: 2 | Status: SHIPPED | OUTPATIENT
Start: 2017-07-06 | End: 2018-08-13

## 2017-07-06 NOTE — TELEPHONE ENCOUNTER
hyzaar  Last Written Prescription Date:  5/2/17  Last Fill Quantity: 30,   # refills: 3  Last Office Visit : 4/19/17  Future Office visit:  7/19/17

## 2017-07-11 DIAGNOSIS — E11.65 TYPE 2 DIABETES MELLITUS WITH HYPERGLYCEMIA (H): ICD-10-CM

## 2017-07-11 DIAGNOSIS — E11.65 TYPE 2 DIABETES MELLITUS WITH HYPERGLYCEMIA, WITH LONG-TERM CURRENT USE OF INSULIN (H): ICD-10-CM

## 2017-07-11 DIAGNOSIS — E11.8 TYPE 2 DIABETES MELLITUS WITH COMPLICATION (H): ICD-10-CM

## 2017-07-11 DIAGNOSIS — Z79.4 TYPE 2 DIABETES MELLITUS WITH HYPERGLYCEMIA, WITH LONG-TERM CURRENT USE OF INSULIN (H): ICD-10-CM

## 2017-07-11 RX ORDER — LIRAGLUTIDE 6 MG/ML
1.8 INJECTION SUBCUTANEOUS DAILY
Qty: 27 ML | Refills: 3 | Status: SHIPPED | OUTPATIENT
Start: 2017-07-11 | End: 2017-12-06

## 2017-07-12 ASSESSMENT — ENCOUNTER SYMPTOMS
MUSCLE WEAKNESS: 0
MUSCLE CRAMPS: 0
JOINT SWELLING: 0
MYALGIAS: 0
NECK PAIN: 0
STIFFNESS: 0
BACK PAIN: 1
ARTHRALGIAS: 0

## 2017-07-19 ENCOUNTER — OFFICE VISIT (OUTPATIENT)
Dept: ENDOCRINOLOGY | Facility: CLINIC | Age: 72
End: 2017-07-19

## 2017-07-19 VITALS
BODY MASS INDEX: 31.05 KG/M2 | SYSTOLIC BLOOD PRESSURE: 145 MMHG | WEIGHT: 216.9 LBS | HEIGHT: 70 IN | DIASTOLIC BLOOD PRESSURE: 80 MMHG | HEART RATE: 82 BPM

## 2017-07-19 DIAGNOSIS — E11.40 TYPE 2 DIABETES MELLITUS WITH DIABETIC NEUROPATHY, WITHOUT LONG-TERM CURRENT USE OF INSULIN (H): Primary | ICD-10-CM

## 2017-07-19 DIAGNOSIS — E11.65 TYPE 2 DIABETES MELLITUS WITH HYPERGLYCEMIA, WITHOUT LONG-TERM CURRENT USE OF INSULIN (H): ICD-10-CM

## 2017-07-19 LAB — HBA1C MFR BLD: 7.1 % (ref 4.3–6)

## 2017-07-19 RX ORDER — GLIPIZIDE 10 MG/1
TABLET ORAL
Qty: 360 TABLET | Refills: 3 | Status: SHIPPED | OUTPATIENT
Start: 2017-07-19 | End: 2017-10-10

## 2017-07-19 RX ORDER — GABAPENTIN 300 MG/1
CAPSULE ORAL
Qty: 270 CAPSULE | Refills: 3 | COMMUNITY
Start: 2017-07-19 | End: 2017-12-06

## 2017-07-19 ASSESSMENT — PAIN SCALES - GENERAL: PAINLEVEL: NO PAIN (0)

## 2017-07-19 NOTE — PROGRESS NOTES
HPI  Diego Velarde is a 72 year old male with type 2 diabetes mellitus here today for a follow up visit.  Justin was dx having type 2 diabetes 5 + years ago.  His diabetes is complicated by neuropathy and nephropathy.  No hx of retinopathy.  For his diabetes, he is currently taking Glucotrol 10 mg 2 tabs BID,  Metformin 1000 mg BID and Victoza 1.8 mg SQ daily.  His A1C is 7.1 % today.  His previous A1C was 7.2 %.  Justin's  A1C was 8.1 % in May 2016.  We downloaded his glucose meter today and his blood sugar values are good at this time without hypoglycemia.  On ROS today, numbness in his toes and feet.    He has chronic back pain.  Some heartburn.  Pt denies visual problems.  Some foot cramps intermittently.  Justin denies n/v, SOB at rest, cough, chest pain, abd pain or diarrhea.  No dysuria, hematuria or foot ulcers.    Diabetes Care  Retinopathy:none; pt seen by Oph in Dec 2016.  Nephropathy:yes; pt's urine microalbuminuria was + in 2/2017.  He is taking Lisinopril.  Neuropathy:yes.   Foot Exam:no ulcers; abnormal monofilamentous exam.  Taking aspirin:yes.  Lipids:LDL was 6 on 2/15/2017.Pt is taking Simvastatin.    ROS  Please see under HPI.    Allergies  No Known Allergies    Medications  Current Outpatient Prescriptions   Medication Sig Dispense Refill     gabapentin (NEURONTIN) 300 MG capsule Take 1 tab at hs x 2 days, then increase 2 tabs at hs x 1 week and then increase 3 days at hs. 270 capsule 3     glipiZIDE (GLUCOTROL) 10 MG tablet Take 2 tabs po BID. 360 tablet 3     blood glucose monitoring (ACCU-CHEK SMARTVIEW) test strip Test blood sugar BID. 4 Box 3     liraglutide (VICTOZA PEN) 18 MG/3ML soln Inject 1.8 mg Subcutaneous daily 90 day supply 27 mL 3     insulin pen needle 32G X 4 MM Use daily. 100 each 11     losartan-hydrochlorothiazide (HYZAAR) 100-25 MG per tablet Take 1 tablet by mouth daily 90 tablet 2     insulin pen needle (BD CAMPBELL U/F) 32G X 4 MM Use daily. 100 each 3     aspirin 81 MG tablet     "    Multiple Vitamin (MULTI VITAMIN DAILY PO)        oxyCODONE-acetaminophen (PERCOCET)  MG per tablet        simvastatin (ZOCOR) 40 MG tablet        metFORMIN (GLUCOPHAGE) 1000 MG tablet Take 1 tablet twice a day - take with breakfast and supper. 60 tablet 11     [DISCONTINUED] glipiZIDE (GLUCOTROL) 10 MG tablet Take 2 tabs po BID. 180 tablet 3     Family History  Father had type 2 diabetes.  He also has a brother with type 2 diabetes.    Social History  Smoke: none at this time; he quit smoking fall of 2015.  ETOH: rare.  .  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    Past Medical History  1.  Type 2 diabetes mellitus dx 5 + years ago.  2.  Neuropathy.  3.  S/P bilateral knee replacement.  4.  S/P left salivary cyst removed.  5.  S/P hernia surgery.  6.  Diverticulitis.  7.  HTN.  8. Hyperlipidemia.    Physical Exam  /80 (BP Location: Right arm, Patient Position: Sitting, Cuff Size: Adult Regular)  Pulse 82  Ht 1.765 m (5' 9.5\")  Wt 98.4 kg (216 lb 14.4 oz)  BMI 31.57 kg/m2  Body mass index is 31.57 kg/(m^2).    GENERAL : In no apparent distress  HEENT: PERRLA; fundi not examined.  NECK: No goiter.  LUNGS: Clear b/l.  CARDIAC: RRR.  ABDOMEN: Nontender.  EXTREMITIES: No edema.    FEET: No ulcers; hammertoes; abnormal monofilamentous exam.    RESULTS  A1C    7.1   7/19/2017  A1C    7.2   4/19/2017  A1C    7.1   2/15/2017  A1C    6.7   11/17/2016  A1C    7.2   8/11/2016  A1C    8.1 %  5/2016      ASSESSMENT/PLAN:    1.  TYPE 2 DIABETES MELLITUS:  Type 2 diabetes mellitus complicated by nephropathy and neuropathy.  Pt's A1C is good at 7.1 % today.  He is to remain on Victoza 1.8 mg SQ daily, Glucotrol 10 mg 2 tabs po BID and Metformin 1000 mg BID.  If he develops n/v or abdominal pain he is to notify me.  Again, I reviewed how Victoza works and possible side effects of the meds including n/v, GI distress, diarrhea, headaches, hypoglycemia and rare risk of pancreatitis/cancer and " thyroid cancer.  Instructed pt to check his blood sugar premeals ( 3 times per day ) DAILY.  He was seen by Oph in 12/2016 without any evidence of retinopathy.  He is taking ASA daily.  TSH normal in Feb 2017.    2. NEUROPATHY: Numbness in both feet.   Start Gabapentin 300 mg 1 tab at hs x 2 days, increase 2 tabs po at hs x 2 weeks and then increase 3 tabs po at hs.    3.  HTN: BP readings at home have been very good and are below:  /71, 114/79, 127/85, 123/78, 125/83 and 124/80.   He is to remain on  Hyzaar 100-25 mg daily.    4.  HYPERLIPIDEMIA: LDL 6 in 2/2017on Simvastatin.  Hx of elevated TG.    5.  OVERWEIGHT: Encouraged pt to make healthy food choices, reduce food portions with meals and remain active.  He has lost some weight.  Pt is to remain on Victoza dose 1.8 mg SQ daily.    6. ED: Pt to see Dr. Weathers in follow up.     7.  FOOT CARE: Referred to Podiatry here.  Pt has hammertoes and neuropathy.  He may benefit from being fitted with diabetic shoes/inserts.  Will discuss Podiatry referral with patient again next visit.     8.  Return to Endocrine Clinic to see me in 3 months.

## 2017-07-19 NOTE — NURSING NOTE
"Chief Complaint   Patient presents with     RECHECK     DIABETES TYPE 2 F/U        Initial /80 (BP Location: Right arm, Patient Position: Sitting, Cuff Size: Adult Regular)  Pulse 82  Ht 1.765 m (5' 9.5\")  Wt 98.4 kg (216 lb 14.4 oz)  BMI 31.57 kg/m2 Estimated body mass index is 31.57 kg/(m^2) as calculated from the following:    Height as of this encounter: 1.765 m (5' 9.5\").    Weight as of this encounter: 98.4 kg (216 lb 14.4 oz).  Medication Reconciliation: complete       Performed A1C test - patient tolerated well.    Sophy Starr, [facility]         "

## 2017-07-19 NOTE — MR AVS SNAPSHOT
After Visit Summary   7/19/2017    Diego Velarde    MRN: [id number]           Patient Information     Date Of Birth          1945        Visit Information        Provider Department      7/19/2017 12:30 PM Kim Almeida PA-C [facility]        Today's Diagnoses     Type 2 diabetes mellitus with diabetic neuropathy, without long-term current use of insulin (H)    -  1    Type 2 diabetes mellitus with hyperglycemia, without long-term current use of insulin (H)           Follow-ups after your visit        Your next 10 appointments already scheduled     Oct 10, 2017 10:00 AM CDT   (Arrive by 9:45 AM)   RETURN DIABETES with PAVEL Connell [facility] ([place] [facility])    [address] [address] [phone]              Who to contact     Please call your clinic at [phone] to:    Ask questions about your health    Make or cancel appointments    Discuss your medicines    Learn about your test results    Speak to your doctor   If you have compliments or concerns about an experience at your clinic, or if you wish to file a complaint, please contact [facility] Patient Relations at [phone] or email us at [email or web address]         Additional Information About Your Visit        MyChart Information     [employer] gives you secure access to your electronic health record. If you see a primary care provider, you can also send messages to your care team and make appointments. If you have questions, please call your primary care clinic.  If you do not have a primary care provider, please call [phone] and they will assist you.      Starteed is an electronic gateway that provides easy, online access to your medical records. With [employer], you can request a clinic appointment, read your test results, renew a prescription or communicate with your care team.     To  "access your existing account, please contact your [facility] or call [phone] for assistance.        Care EveryWhere ID     This is your Care EveryWhere ID. This could be used by other organizations to access your [place] medical records  [id number]        Your Vitals Were     Pulse Height BMI (Body Mass Index)             82 1.765 m (5' 9.5\") 31.57 kg/m2          Blood Pressure from Last 3 Encounters:   07/19/17 145/80   04/19/17 146/87   02/27/17 150/84    Weight from Last 3 Encounters:   07/19/17 98.4 kg (216 lb 14.4 oz)   04/19/17 101 kg (222 lb 11.2 oz)   02/27/17 97.5 kg (215 lb)              Today, you had the following     No orders found for display         Today's Medication Changes          These changes are accurate as of: 7/19/17  1:42 PM.  If you have any questions, ask your nurse or doctor.               Start taking these medicines.        Dose/Directions    blood glucose monitoring test strip   Commonly known as:  ACCU-CHEK SMARTVIEW   Used for:  Type 2 diabetes mellitus with hyperglycemia, without long-term current use of insulin (H)        Test blood sugar BID.   Quantity:  4 Box   Refills:  3         Stop taking these medicines if you haven't already. Please contact your care team if you have questions.     pregabalin 50 MG capsule   Commonly known as:  LYRICA           testosterone 30 MG/ACT topical solution   Commonly known as:  AXIRON                Where to get your medicines      These medications were sent to [facility]/pharmacy #[id number] [address] [address]     Phone:  [phone]     blood glucose monitoring test strip    glipiZIDE 10 MG tablet                Primary Care Provider Office Phone # Fax #    Sharmlia EULALIO Sauceda [phone]       [facility] [address] [facility] [address]        Equal Access to Services     POOL VICTORIA AH: Phill Merino, " warickyda luheatheradaha, qaybta kaalmada kaylan, aaron branchaasri ah. So [facility] [phone].    ATENCIÓN: Si kaylyn guthrie, tiene a ken disposición servicios gratuitos de asistencia lingüística. Luis al [phone].    We comply with applicable federal civil rights laws and [place] laws. We do not discriminate on the basis of race, color, national origin, age, disability sex, sexual orientation or gender identity.            Thank you!     Thank you for choosing [facility] ENDOCRINOLOGY  for your care. Our goal is always to provide you with excellent care. Hearing back from our patients is one way we can continue to improve our services. Please take a few minutes to complete the written survey that you may receive in the mail after your visit with us. Thank you!             Your Updated Medication List - Protect others around you: Learn how to safely use, store and throw away your medicines at www.disposemymeds.org.          This list is accurate as of: 7/19/17  1:42 PM.  Always use your most recent med list.                   Brand Name Dispense Instructions for use Diagnosis    aspirin 81 MG tablet           blood glucose monitoring test strip    ACCU-CHEK SMARTVIEW    4 Box    Test blood sugar BID.    Type 2 diabetes mellitus with hyperglycemia, without long-term current use of insulin (H)       gabapentin 300 MG capsule    NEURONTIN    270 capsule    Take 1 tab at hs x 2 days, then increase 2 tabs at hs x 1 week and then increase 3 days at hs.    Type 2 diabetes mellitus with diabetic neuropathy, without long-term current use of insulin (H)       glipiZIDE 10 MG tablet    GLUCOTROL    360 tablet    Take 2 tabs po BID.    Type 2 diabetes mellitus with hyperglycemia, without long-term current use of insulin (H)       * insulin pen needle 32G X 4 MM    BD CAMPBELL U/F    100 each    Use daily.    Essential hypertension       * insulin pen needle 32G X 4 MM     100 each    Use daily.    Type 2 diabetes  mellitus with hyperglycemia (H), Type 2 diabetes mellitus with complication (H)       liraglutide 18 MG/3ML soln    VICTOZA PEN    27 mL    Inject 1.8 mg Subcutaneous daily 90 day supply    Type 2 diabetes mellitus with hyperglycemia, with long-term current use of insulin (H)       losartan-hydrochlorothiazide 100-25 MG per tablet    HYZAAR    90 tablet    Take 1 tablet by mouth daily    Essential hypertension       metFORMIN 1000 MG tablet    GLUCOPHAGE    60 tablet    Take 1 tablet twice a day - take with breakfast and supper.    Type 2 diabetes mellitus with complication (H)       MULTI VITAMIN DAILY PO           oxyCODONE-acetaminophen  MG per tablet    PERCOCET          simvastatin 40 MG tablet    ZOCOR          * Notice:  This list has 2 medication(s) that are the same as other medications prescribed for you. Read the directions carefully, and ask your doctor or other care provider to review them with you.

## 2017-07-19 NOTE — LETTER
7/19/2017       RE: Diego Velarde  [address] [place] [address]     Dear Colleague,    Thank you for referring your patient, Diego Velarde, to the [facility] ENDOCRINOLOGY at [facility]. Please see a copy of my visit note below.    HPI  Diego Velarde is a 72 year old male with type 2 diabetes mellitus here today for a follow up visit.  Justin was dx having type 2 diabetes 5 + years ago.  His diabetes is complicated by neuropathy and nephropathy.  No hx of retinopathy.  For his diabetes, he is currently taking Glucotrol 10 mg 2 tabs BID,  Metformin 1000 mg BID and Victoza 1.8 mg SQ daily.  His A1C is 7.1 % today.  His previous A1C was 7.2 %.  Justin's  A1C was 8.1 % in May 2016.  We downloaded his glucose meter today and his blood sugar values are good at this time without hypoglycemia.  On ROS today, numbness in his toes and feet.    He has chronic back pain.  Some heartburn.  Pt denies visual problems.  Some foot cramps intermittently.  Justin denies n/v, SOB at rest, cough, chest pain, abd pain or diarrhea.  No dysuria, hematuria or foot ulcers.    Diabetes Care  Retinopathy:none; pt seen by Oph in Dec 2016.  Nephropathy:yes; pt's urine microalbuminuria was + in 2/2017.  He is taking Lisinopril.  Neuropathy:yes.   Foot Exam:no ulcers; abnormal monofilamentous exam.  Taking aspirin:yes.  Lipids:LDL was 6 on 2/15/2017.Pt is taking Simvastatin.    ROS  Please see under HPI.    Allergies  No Known Allergies    Medications  Current Outpatient Prescriptions   Medication Sig Dispense Refill     gabapentin (NEURONTIN) 300 MG capsule Take 1 tab at hs x 2 days, then increase 2 tabs at hs x 1 week and then increase 3 days at hs. 270 capsule 3     glipiZIDE (GLUCOTROL) 10 MG tablet Take 2 tabs po BID. 360 tablet 3     blood glucose monitoring (ACCU-CHEK SMARTVIEW) test strip Test blood sugar BID. 4 Box 3     liraglutide (VICTOZA PEN) 18 MG/3ML soln Inject 1.8 mg Subcutaneous  "daily 90 day supply 27 mL 3     insulin pen needle 32G X 4 MM Use daily. 100 each 11     losartan-hydrochlorothiazide (HYZAAR) 100-25 MG per tablet Take 1 tablet by mouth daily 90 tablet 2     insulin pen needle (BD CAMPBELL U/F) 32G X 4 MM Use daily. 100 each 3     aspirin 81 MG tablet        Multiple Vitamin (MULTI VITAMIN DAILY PO)        oxyCODONE-acetaminophen (PERCOCET)  MG per tablet        simvastatin (ZOCOR) 40 MG tablet        metFORMIN (GLUCOPHAGE) 1000 MG tablet Take 1 tablet twice a day - take with breakfast and supper. 60 tablet 11     [DISCONTINUED] glipiZIDE (GLUCOTROL) 10 MG tablet Take 2 tabs po BID. 180 tablet 3     Family History  Father had type 2 diabetes.  He also has a brother with type 2 diabetes.    Social History  Smoke: none at this time; he quit smoking fall of 2015.  ETOH: rare.  .  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    Past Medical History  1.  Type 2 diabetes mellitus dx 5 + years ago.  2.  Neuropathy.  3.  S/P bilateral knee replacement.  4.  S/P left salivary cyst removed.  5.  S/P hernia surgery.  6.  Diverticulitis.  7.  HTN.  8. Hyperlipidemia.    Physical Exam  /80 (BP Location: Right arm, Patient Position: Sitting, Cuff Size: Adult Regular)  Pulse 82  Ht 1.765 m (5' 9.5\")  Wt 98.4 kg (216 lb 14.4 oz)  BMI 31.57 kg/m2  Body mass index is 31.57 kg/(m^2).    GENERAL : In no apparent distress  HEENT: PERRLA; fundi not examined.  NECK: No goiter.  LUNGS: Clear b/l.  CARDIAC: RRR.  ABDOMEN: Nontender.  EXTREMITIES: No edema.    FEET: No ulcers; hammertoes; abnormal monofilamentous exam.    RESULTS  A1C    7.1   7/19/2017  A1C    7.2   4/19/2017  A1C    7.1   2/15/2017  A1C    6.7   11/17/2016  A1C    7.2   8/11/2016  A1C    8.1 %  5/2016      ASSESSMENT/PLAN:    1.  TYPE 2 DIABETES MELLITUS:  Type 2 diabetes mellitus complicated by nephropathy and neuropathy.  Pt's A1C is good at 7.1 % today.  He is to remain on Victoza 1.8 mg SQ daily, Glucotrol " 10 mg 2 tabs po BID and Metformin 1000 mg BID.  If he develops n/v or abdominal pain he is to notify me.  Again, I reviewed how Victoza works and possible side effects of the meds including n/v, GI distress, diarrhea, headaches, hypoglycemia and rare risk of pancreatitis/cancer and thyroid cancer.  Instructed pt to check his blood sugar premeals ( 3 times per day ) DAILY.  He was seen by Oph in 12/2016 without any evidence of retinopathy.  He is taking ASA daily.  TSH normal in Feb 2017.    2. NEUROPATHY: Numbness in both feet.   Start Gabapentin 300 mg 1 tab at hs x 2 days, increase 2 tabs po at hs x 2 weeks and then increase 3 tabs po at hs.    3.  HTN: BP readings at home have been very good and are below:  /71, 114/79, 127/85, 123/78, 125/83 and 124/80.   He is to remain on  Hyzaar 100-25 mg daily.    4.  HYPERLIPIDEMIA: LDL 6 in 2/2017on Simvastatin.  Hx of elevated TG.    5.  OVERWEIGHT: Encouraged pt to make healthy food choices, reduce food portions with meals and remain active.  He has lost some weight.  Pt is to remain on Victoza dose 1.8 mg SQ daily.    6. ED: Pt to see Dr. Weathers in follow up.     7.  FOOT CARE: Referred to Podiatry here.  Pt has hammertoes and neuropathy.  He may benefit from being fitted with diabetic shoes/inserts.  Will discuss Podiatry referral with patient again next visit.     8.  Return to Endocrine Clinic to see me in 3 months.      Again, thank you for allowing me to participate in the care of your patient.      Sincerely,    Kim Almeida PA-C

## 2017-08-21 ENCOUNTER — MYC MEDICAL ADVICE (OUTPATIENT)
Dept: UROLOGY | Facility: CLINIC | Age: 72
End: 2017-08-21

## 2017-08-21 DIAGNOSIS — N52.01 ERECTILE DYSFUNCTION DUE TO ARTERIAL INSUFFICIENCY: ICD-10-CM

## 2017-09-05 ENCOUNTER — MYC MEDICAL ADVICE (OUTPATIENT)
Dept: ENDOCRINOLOGY | Facility: CLINIC | Age: 72
End: 2017-09-05

## 2017-09-05 RX ORDER — TADALAFIL 20 MG/1
TABLET ORAL
Qty: 6 TABLET | Refills: 11 | Status: SHIPPED | OUTPATIENT
Start: 2017-09-05 | End: 2017-10-10

## 2017-09-05 NOTE — TELEPHONE ENCOUNTER
See my chart msg below.  Will send Rx for Cialis 20 mg and refer to Urology for further eval re the ED since I am stopping clinical practice.

## 2017-09-28 ENCOUNTER — TRANSFERRED RECORDS (OUTPATIENT)
Dept: HEALTH INFORMATION MANAGEMENT | Facility: CLINIC | Age: 72
End: 2017-09-28

## 2017-10-10 ENCOUNTER — OFFICE VISIT (OUTPATIENT)
Dept: ENDOCRINOLOGY | Facility: CLINIC | Age: 72
End: 2017-10-10

## 2017-10-10 ENCOUNTER — MYC MEDICAL ADVICE (OUTPATIENT)
Dept: ENDOCRINOLOGY | Facility: CLINIC | Age: 72
End: 2017-10-10

## 2017-10-10 VITALS
WEIGHT: 220 LBS | DIASTOLIC BLOOD PRESSURE: 83 MMHG | SYSTOLIC BLOOD PRESSURE: 157 MMHG | HEIGHT: 69 IN | HEART RATE: 74 BPM | BODY MASS INDEX: 32.58 KG/M2

## 2017-10-10 DIAGNOSIS — E11.9 DIABETES MELLITUS, TYPE 2 (H): Primary | ICD-10-CM

## 2017-10-10 DIAGNOSIS — N52.01 ERECTILE DYSFUNCTION DUE TO ARTERIAL INSUFFICIENCY: ICD-10-CM

## 2017-10-10 DIAGNOSIS — E11.65 TYPE 2 DIABETES MELLITUS WITH HYPERGLYCEMIA, WITHOUT LONG-TERM CURRENT USE OF INSULIN (H): ICD-10-CM

## 2017-10-10 LAB — HBA1C MFR BLD: 6.6 % (ref 4.3–6)

## 2017-10-10 RX ORDER — GABAPENTIN 300 MG/1
CAPSULE ORAL
Qty: 450 CAPSULE | Refills: 3 | Status: SHIPPED | OUTPATIENT
Start: 2017-10-10 | End: 2017-12-06

## 2017-10-10 RX ORDER — GLIPIZIDE 10 MG/1
TABLET ORAL
Qty: 360 TABLET | Refills: 3 | Status: SHIPPED | OUTPATIENT
Start: 2017-10-10 | End: 2017-12-06

## 2017-10-10 RX ORDER — TADALAFIL 20 MG/1
TABLET ORAL
Qty: 6 TABLET | Refills: 11 | Status: SHIPPED | OUTPATIENT
Start: 2017-10-10 | End: 2017-12-06

## 2017-10-10 ASSESSMENT — PAIN SCALES - GENERAL: PAINLEVEL: NO PAIN (0)

## 2017-10-10 NOTE — LETTER
10/10/2017       RE: Diego Velarde  [address] [place] [address]     Dear Colleague,    Thank you for referring your patient, Diego Velarde, to the [facility] ENDOCRINOLOGY at [facility]. Please see a copy of my visit note below.    HPI  Diego Velarde is a 72 year old male with type 2 diabetes mellitus here today for a follow up visit.  Justin was dx having type 2 diabetes 5 + years ago.  His diabetes is complicated by neuropathy and nephropathy.  No hx of retinopathy.  For his diabetes, he is currently taking Glucotrol 10 mg 2 tabs BID,  Metformin 1000 mg BID and Victoza 1.8 mg SQ daily.  His A1C is 6.6% today.  His previous A1C was 7.1 %.  Justin's  A1C was 8.1 % in May 2016.  We downloaded his glucose meter today and his blood sugar values are good at this time without hypoglycemia.  His average glucose was 132 over the past month.  On ROS today, numbness in his toes and feet.  He reports increased tingling in both feet at this time. He states he stopped taking gabapentin several months ago for no good reason.  He has chronic back pain.  Some heartburn.  Pt denies visual problems.  Justin denies n/v, SOB at rest, cough, chest pain, abd pain or diarrhea.  No dysuria, hematuria or foot ulcers.    Diabetes Care  Retinopathy:none; pt seen by Oph in Dec 2016.  Nephropathy:yes; pt's urine microalbuminuria was + in 2/2017.  He is taking Lisinopril.  Neuropathy:yes.   Foot Exam:no ulcers; abnormal monofilamentous exam.  Taking aspirin:yes.  Lipids:LDL was 6 on 2/15/2017.Pt is taking Simvastatin.    ROS  Please see under HPI.    Allergies  No Known Allergies    Medications  Current Outpatient Prescriptions   Medication Sig Dispense Refill     glipiZIDE (GLUCOTROL) 10 MG tablet Take 2 tabs po BID. 360 tablet 3     tadalafil (CIALIS) 20 MG tablet Take 1 tab 60 min or more prior to planned activity. 6 tablet 11     gabapentin (NEURONTIN) 300 MG capsule Take 1 tab at hs x 2  "days, then increase 2 tabs at hs x 1 week and then increase 3 days at hs. 270 capsule 3     blood glucose monitoring (ACCU-CHEK SMARTVIEW) test strip Test blood sugar BID. 4 Box 3     liraglutide (VICTOZA PEN) 18 MG/3ML soln Inject 1.8 mg Subcutaneous daily 90 day supply 27 mL 3     insulin pen needle 32G X 4 MM Use daily. 100 each 11     losartan-hydrochlorothiazide (HYZAAR) 100-25 MG per tablet Take 1 tablet by mouth daily 90 tablet 2     insulin pen needle (BD CAMPBELL U/F) 32G X 4 MM Use daily. 100 each 3     aspirin 81 MG tablet        Multiple Vitamin (MULTI VITAMIN DAILY PO)        oxyCODONE-acetaminophen (PERCOCET)  MG per tablet        simvastatin (ZOCOR) 40 MG tablet        metFORMIN (GLUCOPHAGE) 1000 MG tablet Take 1 tablet twice a day - take with breakfast and supper. 60 tablet 11     [DISCONTINUED] glipiZIDE (GLUCOTROL) 10 MG tablet Take 2 tabs po BID. 360 tablet 3     Family History  Father had type 2 diabetes.  He also has a brother with type 2 diabetes.    Social History  Smoke: none at this time; he quit smoking fall of 2015.  ETOH: rare.  .  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    Past Medical History  1.  Type 2 diabetes mellitus dx 5 + years ago.  2.  Neuropathy.  3.  S/P bilateral knee replacement.  4.  S/P left salivary cyst removed.  5.  S/P hernia surgery.  6.  Diverticulitis.  7.  HTN.  8. Hyperlipidemia.    Physical Exam  /83  Pulse 74  Ht 1.753 m (5' 9\")  Wt 99.8 kg (220 lb)  BMI 32.49 kg/m2  Body mass index is 32.49 kg/(m^2).    GENERAL : In no apparent distress  HEENT: PERRLA; fundi not examined.  NECK: No goiter.  LUNGS: Clear b/l.  CARDIAC: RRR.  ABDOMEN: Nontender.  EXTREMITIES: No edema.    FEET: No ulcers; hammertoes; abnormal monofilamentous exam.    RESULTS  A1C    7.1   7/19/2017  A1C    7.2   4/19/2017  A1C    7.1   2/15/2017  A1C    6.7   11/17/2016  A1C    7.2   8/11/2016  A1C    8.1 %  5/2016      ASSESSMENT/PLAN:    1.  TYPE 2 DIABETES " MELLITUS:  Type 2 diabetes mellitus complicated by nephropathy and neuropathy.  Pt's A1C is good at 6.6 % today.  He is to remain on Victoza 1.8 mg SQ daily, Glucotrol 10 mg 2 tabs po BID and Metformin 1000 mg BID.  If he develops n/v or abdominal pain he is to notify me.  Again, I reviewed how Victoza works and possible side effects of the meds including n/v, GI distress, diarrhea, headaches, hypoglycemia and rare risk of pancreatitis/cancer and thyroid cancer.  Instructed pt to check his blood sugar premeals ( 3 times per day ) DAILY.  He was seen by Oph in 12/2016 without any evidence of retinopathy.  He is taking ASA daily.  TSH normal in Feb 2017.  Patient states he had the flu vaccine last week.    2. NEUROPATHY: Justin reports increased numbness and tingling in both feet.  Start Gabapentin 300 mg 2 tabs each a.m. and 3 tabs at bedtime.  He has been informed that gabapentin can cause drowsiness. If his numbness and tingling do not improve, we'll refer to neurology clinic for evaluation.    3.  HTN: Patient's BP is higher today. He has been taking a lower dose of Hyzaar 100-12.5 mg daily.  I asked him to resume his higher dose of Hyzaar 100-25 mg daily.  Justin was instructed to check his BP at home and send me his BP readings via [employer] next week for review.    4.  HYPERLIPIDEMIA: LDL 6 in 2/2017 on Simvastatin.  Hx of elevated TG.    5.  OVERWEIGHT: Encouraged pt to make healthy food choices, reduce food portions with meals and remain active.  He has lost some weight.  Pt is to remain on Victoza dose 1.8 mg SQ daily.    6. ED: Pt to see Dr. Love for ED and low libido evaluation.     7.  FOOT CARE: Referred to Podiatry here.  Pt has hammertoes and neuropathy.  He may benefit from being fitted with diabetic shoes/inserts.  I suggested that he be seen by Podiatry here.     8.  Return to Endocrine Clinic to see me in 3 months.        Kim Almeida PA-C

## 2017-10-10 NOTE — MR AVS SNAPSHOT
After Visit Summary   10/10/2017    Diego Velarde    MRN: [id number]           Patient Information     Date Of Birth          1945        Visit Information        Provider Department      10/10/2017 10:00 AM Kim Almeida PA-C [facility]        Today's Diagnoses     Type 2 diabetes mellitus with hyperglycemia, without long-term current use of insulin (H)          Care Instructions    1. Continue current diabetic meds.  Glipizide was renewed today.  2.  See Dr. Love regarding low libido and ED.  3.  Start Gabapentin 300 mg 2 tabs in am and 3 tabs at bedtime.  Check your expiration date on your Gabapentin bottle today and if , notify me.  4.  Take correct dose of Hyzaar 100-25 mg daily.  5.  Send me blood pressure readings in 1 week.  6.  See me 3 months.  Kim Almeida PA-C          Follow-ups after your visit        Your next 10 appointments already scheduled     Dec 06, 2017  1:00 PM CST   (Arrive by 12:45 PM)   RETURN DIABETES with Sammy Alonso MD   [facility] ([facility])    [address] [place] [id number] [phone]            Nik 10, 2018 10:30 AM CST   (Arrive by 10:15 AM)   RETURN DIABETES with Kim Almeida PA-C   [facility] ([facility])    [address] [place] [address] [phone]              Who to contact     Please call your clinic at [phone] to:    Ask questions about your health    Make or cancel appointments    Discuss your medicines    Learn about your test results    Speak to your doctor   If you have compliments or concerns about an experience at your clinic, or if you wish to file a complaint, please contact [facility] Patient Relations at [phone] or email us at [email or web address]         Additional Information About Your Visit       "  MyChart Information     [employer] gives you secure access to your electronic health record. If you see a primary care provider, you can also send messages to your care team and make appointments. If you have questions, please call your primary care clinic.  If you do not have a primary care provider, please call [phone] and they will assist you.      [employer] is an electronic gateway that provides easy, online access to your medical records. With [employer], you can request a clinic appointment, read your test results, renew a prescription or communicate with your care team.     To access your existing account, please contact your [facility] or call [phone] for assistance.        Care EveryWhere ID     This is your Care EveryWhere ID. This could be used by other organizations to access your [facility] medical records  [id number]        Your Vitals Were     Pulse Height BMI (Body Mass Index)             74 1.753 m (5' 9\") 32.49 kg/m2          Blood Pressure from Last 3 Encounters:   10/10/17 157/83   07/19/17 145/80   04/19/17 146/87    Weight from Last 3 Encounters:   10/10/17 99.8 kg (220 lb)   07/19/17 98.4 kg (216 lb 14.4 oz)   04/19/17 101 kg (222 lb 11.2 oz)              Today, you had the following     No orders found for display         Where to get your medicines      These medications were sent to [facility]/pharmacy #[id number] [place] [address]     Phone:  [phone]     glipiZIDE 10 MG tablet          Primary Care Provider Office Phone # Fax #    Sharmila TSAI [facility] [phone]       [facility] [address] [facility] [address]        Equal Access to Services     POOL VICTORIA AH: Phill Merino, warickyda shaheed, qaybta kaalmada kaylan, aaron harris. So [facility] [phone].    ATENCIÓN: Si habla español, tiene a ken disposición servicios gratuitos " de asistencia lingüística. Luis jara [phone].    We comply with applicable federal civil rights laws and [place] laws. We do not discriminate on the basis of race, color, national origin, age, disability, sex, sexual orientation, or gender identity.            Thank you!     Thank you for choosing [facility] ENDOCRINOLOGY  for your care. Our goal is always to provide you with excellent care. Hearing back from our patients is one way we can continue to improve our services. Please take a few minutes to complete the written survey that you may receive in the mail after your visit with us. Thank you!             Your Updated Medication List - Protect others around you: Learn how to safely use, store and throw away your medicines at www.disposemymeds.org.          This list is accurate as of: 10/10/17 10:52 AM.  Always use your most recent med list.                   Brand Name Dispense Instructions for use Diagnosis    aspirin 81 MG tablet           blood glucose monitoring test strip    ACCU-CHEK SMARTVIEW    4 Box    Test blood sugar BID.    Type 2 diabetes mellitus with hyperglycemia, without long-term current use of insulin (H)       gabapentin 300 MG capsule    NEURONTIN    270 capsule    Take 1 tab at hs x 2 days, then increase 2 tabs at hs x 1 week and then increase 3 days at hs.    Type 2 diabetes mellitus with diabetic neuropathy, without long-term current use of insulin (H)       glipiZIDE 10 MG tablet    GLUCOTROL    360 tablet    Take 2 tabs po BID.    Type 2 diabetes mellitus with hyperglycemia, without long-term current use of insulin (H)       * insulin pen needle 32G X 4 MM    BD CAMPBELL U/F    100 each    Use daily.    Essential hypertension       * insulin pen needle 32G X 4 MM     100 each    Use daily.    Type 2 diabetes mellitus with hyperglycemia (H), Type 2 diabetes mellitus with complication (H)       liraglutide 18 MG/3ML soln    VICTOZA PEN    27 mL    Inject 1.8 mg Subcutaneous daily 90 day  supply    Type 2 diabetes mellitus with hyperglycemia, with long-term current use of insulin (H)       losartan-hydrochlorothiazide 100-25 MG per tablet    HYZAAR    90 tablet    Take 1 tablet by mouth daily    Essential hypertension       metFORMIN 1000 MG tablet    GLUCOPHAGE    60 tablet    Take 1 tablet twice a day - take with breakfast and supper.    Type 2 diabetes mellitus with complication (H)       MULTI VITAMIN DAILY PO           oxyCODONE-acetaminophen  MG per tablet    PERCOCET          simvastatin 40 MG tablet    ZOCOR          tadalafil 20 MG tablet    CIALIS    6 tablet    Take 1 tab 60 min or more prior to planned activity.    Erectile dysfunction due to arterial insufficiency       * Notice:  This list has 2 medication(s) that are the same as other medications prescribed for you. Read the directions carefully, and ask your doctor or other care provider to review them with you.

## 2017-10-10 NOTE — PROGRESS NOTES
HPI  Diego Velarde is a 72 year old male with type 2 diabetes mellitus here today for a follow up visit.  Justin was dx having type 2 diabetes 5 + years ago.  His diabetes is complicated by neuropathy and nephropathy.  No hx of retinopathy.  For his diabetes, he is currently taking Glucotrol 10 mg 2 tabs BID,  Metformin 1000 mg BID and Victoza 1.8 mg SQ daily.  His A1C is 6.6% today.  His previous A1C was 7.1 %.  Justin's  A1C was 8.1 % in May 2016.  We downloaded his glucose meter today and his blood sugar values are good at this time without hypoglycemia.  His average glucose was 132 over the past month.  On ROS today, numbness in his toes and feet.  He reports increased tingling in both feet at this time. He states he stopped taking gabapentin several months ago for no good reason.  He has chronic back pain.  Some heartburn.  Pt denies visual problems.  Justin denies n/v, SOB at rest, cough, chest pain, abd pain or diarrhea.  No dysuria, hematuria or foot ulcers.    Diabetes Care  Retinopathy:none; pt seen by Oph in Dec 2016.  Nephropathy:yes; pt's urine microalbuminuria was + in 2/2017.  He is taking Lisinopril.  Neuropathy:yes.   Foot Exam:no ulcers; abnormal monofilamentous exam.  Taking aspirin:yes.  Lipids:LDL was 6 on 2/15/2017.Pt is taking Simvastatin.    ROS  Please see under HPI.    Allergies  No Known Allergies    Medications  Current Outpatient Prescriptions   Medication Sig Dispense Refill     glipiZIDE (GLUCOTROL) 10 MG tablet Take 2 tabs po BID. 360 tablet 3     tadalafil (CIALIS) 20 MG tablet Take 1 tab 60 min or more prior to planned activity. 6 tablet 11     gabapentin (NEURONTIN) 300 MG capsule Take 1 tab at hs x 2 days, then increase 2 tabs at hs x 1 week and then increase 3 days at hs. 270 capsule 3     blood glucose monitoring (ACCU-CHEK SMARTVIEW) test strip Test blood sugar BID. 4 Box 3     liraglutide (VICTOZA PEN) 18 MG/3ML soln Inject 1.8 mg Subcutaneous daily 90 day supply 27 mL 3      "insulin pen needle 32G X 4 MM Use daily. 100 each 11     losartan-hydrochlorothiazide (HYZAAR) 100-25 MG per tablet Take 1 tablet by mouth daily 90 tablet 2     insulin pen needle (BD CAMPBELL U/F) 32G X 4 MM Use daily. 100 each 3     aspirin 81 MG tablet        Multiple Vitamin (MULTI VITAMIN DAILY PO)        oxyCODONE-acetaminophen (PERCOCET)  MG per tablet        simvastatin (ZOCOR) 40 MG tablet        metFORMIN (GLUCOPHAGE) 1000 MG tablet Take 1 tablet twice a day - take with breakfast and supper. 60 tablet 11     [DISCONTINUED] glipiZIDE (GLUCOTROL) 10 MG tablet Take 2 tabs po BID. 360 tablet 3     Family History  Father had type 2 diabetes.  He also has a brother with type 2 diabetes.    Social History  Smoke: none at this time; he quit smoking fall of 2015.  ETOH: rare.  .  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    Past Medical History  1.  Type 2 diabetes mellitus dx 5 + years ago.  2.  Neuropathy.  3.  S/P bilateral knee replacement.  4.  S/P left salivary cyst removed.  5.  S/P hernia surgery.  6.  Diverticulitis.  7.  HTN.  8. Hyperlipidemia.    Physical Exam  /83  Pulse 74  Ht 1.753 m (5' 9\")  Wt 99.8 kg (220 lb)  BMI 32.49 kg/m2  Body mass index is 32.49 kg/(m^2).    GENERAL : In no apparent distress  HEENT: PERRLA; fundi not examined.  NECK: No goiter.  LUNGS: Clear b/l.  CARDIAC: RRR.  ABDOMEN: Nontender.  EXTREMITIES: No edema.    FEET: No ulcers; hammertoes; abnormal monofilamentous exam.    RESULTS  A1C    7.1   7/19/2017  A1C    7.2   4/19/2017  A1C    7.1   2/15/2017  A1C    6.7   11/17/2016  A1C    7.2   8/11/2016  A1C    8.1 %  5/2016      ASSESSMENT/PLAN:    1.  TYPE 2 DIABETES MELLITUS:  Type 2 diabetes mellitus complicated by nephropathy and neuropathy.  Pt's A1C is good at 6.6 % today.  He is to remain on Victoza 1.8 mg SQ daily, Glucotrol 10 mg 2 tabs po BID and Metformin 1000 mg BID.  If he develops n/v or abdominal pain he is to notify me.  Again, I " reviewed how Victoza works and possible side effects of the meds including n/v, GI distress, diarrhea, headaches, hypoglycemia and rare risk of pancreatitis/cancer and thyroid cancer.  Instructed pt to check his blood sugar premeals ( 3 times per day ) DAILY.  He was seen by Oph in 12/2016 without any evidence of retinopathy.  He is taking ASA daily.  TSH normal in Feb 2017.  Patient states he had the flu vaccine last week.    2. NEUROPATHY: Justin reports increased numbness and tingling in both feet.  Start Gabapentin 300 mg 2 tabs each a.m. and 3 tabs at bedtime.  He has been informed that gabapentin can cause drowsiness. If his numbness and tingling do not improve, we'll refer to neurology clinic for evaluation.    3.  HTN: Patient's BP is higher today. He has been taking a lower dose of Hyzaar 100-12.5 mg daily.  I asked him to resume his higher dose of Hyzaar 100-25 mg daily.  Justin was instructed to check his BP at home and send me his BP readings via [employer] next week for review.    4.  HYPERLIPIDEMIA: LDL 6 in 2/2017 on Simvastatin.  Hx of elevated TG.    5.  OVERWEIGHT: Encouraged pt to make healthy food choices, reduce food portions with meals and remain active.  He has lost some weight.  Pt is to remain on Victoza dose 1.8 mg SQ daily.    6. ED: Pt to see Dr. Love for ED and low libido evaluation.     7.  FOOT CARE: Referred to Podiatry here.  Pt has hammertoes and neuropathy.  He may benefit from being fitted with diabetic shoes/inserts.  I suggested that he be seen by Podiatry here.     8.  Return to Endocrine Clinic to see me in 3 months.

## 2017-10-10 NOTE — PATIENT INSTRUCTIONS
1. Continue current diabetic meds.  Glipizide was renewed today.  2.  See Dr. Love regarding low libido and ED.  3.  Start Gabapentin 300 mg 2 tabs in am and 3 tabs at bedtime.  Check your expiration date on your Gabapentin bottle today and if , notify me.  4.  Take correct dose of Hyzaar 100-25 mg daily.  5.  Send me blood pressure readings in 1 week.  6.  See me 3 months.  Kim Almeida PA-C

## 2017-10-11 NOTE — TELEPHONE ENCOUNTER
Spoke with mike Valdivia and relayed provider message below, he will fill the Rx and call pt.  ----- Message from Kim Almeida PA-C sent at 10/11/2017 12:11 PM CDT -----  Regarding: RE: Concerns with RX  Contact: [phone]  Could you please let the pharmacist know I spoke to Bill about the possible side effect of drowsiness and if he becomes drowsy he knows he needs to reduce the dose.  Thanks vinita                              See pharmacy note below. Let me know, thanks.  ----- Message -----     From: Ora Jones     Sent: 10/10/2017   2:33 PM       To: [facility]  Subject: Concerns with RX                                 Pharmacist from [facility] called stating patient has a RX:gabapentin 300 mg. Pharmacist is concern due to high dosage patient will become drowsy and fall and become hurt. Please call Pharmacy back to make sure the dosage is ok for patient. Pharmacy can be reached at [phone].     Thank you,    Ora  Call Center

## 2017-12-06 ENCOUNTER — OFFICE VISIT (OUTPATIENT)
Dept: ENDOCRINOLOGY | Facility: CLINIC | Age: 72
End: 2017-12-06

## 2017-12-06 VITALS
HEART RATE: 94 BPM | DIASTOLIC BLOOD PRESSURE: 74 MMHG | BODY MASS INDEX: 32.61 KG/M2 | HEIGHT: 69 IN | SYSTOLIC BLOOD PRESSURE: 147 MMHG | WEIGHT: 220.2 LBS

## 2017-12-06 DIAGNOSIS — E29.1 MALE HYPOGONADISM: Primary | ICD-10-CM

## 2017-12-06 DIAGNOSIS — R35.0 URINARY FREQUENCY: ICD-10-CM

## 2017-12-06 RX ORDER — TESTOSTERONE 30 MG/1.5ML
2 SOLUTION TOPICAL DAILY
Qty: 90 ML | Refills: 3 | Status: SHIPPED | OUTPATIENT
Start: 2017-12-06 | End: 2018-01-18

## 2017-12-06 RX ORDER — RABEPRAZOLE SODIUM 20 MG/1
20 TABLET, DELAYED RELEASE ORAL DAILY
COMMUNITY
End: 2017-12-06

## 2017-12-06 RX ORDER — LIRAGLUTIDE 6 MG/ML
1.8 INJECTION SUBCUTANEOUS DAILY
COMMUNITY
End: 2018-07-09

## 2017-12-06 RX ORDER — CLOTRIMAZOLE 1 G/ML
SOLUTION TOPICAL 2 TIMES DAILY
COMMUNITY
End: 2017-12-06

## 2017-12-06 ASSESSMENT — PAIN SCALES - GENERAL: PAINLEVEL: NO PAIN (0)

## 2017-12-06 NOTE — PATIENT INSTRUCTIONS
Start Alpha lipoic acid 600 mg oral daily, this is over the counter.     Resume testosterone.   Labs in January in the morning. Do apply testosterone prior to the labs.     FU in 6 months

## 2017-12-06 NOTE — LETTER
12/6/2017       RE: Diego Velarde  [address] [place] [address]     Dear Colleague,    Thank you for referring your patient, Diego Velarde, to the [facility] ENDOCRINOLOGY at [facility]. Please see a copy of my visit note below.     Endocrinology progress note    Type of visit: Initial follow-up visit with me, previously seen by Dr. Weathers.     HPI   Diego Velarde is a 72 year old male with type 2 diabetes mellitus and hypogonadism. Justin was dx having type 2 diabetes 5 + years ago, complicated by neuropathy and nephropathy, No hx of retinopathy.   For his diabetes, he is currently taking Glipizide 10 mg 2 tabs BID, Metformin 1000 mg BID and Victoza 1.8 mg SQ daily. His A1C is 6.6% in October. His previous A1C was 7.1 %. AM  with SD 37  He complains of numbness in his toes and feet. He reports increased tingling in both feet at this time. He resumed Gabapentin. He has chronic back pain and some heartburn.     Hypogonadism  Justin has absent libido, poor erection. Considers having sex to be very important part of his family life.  His wife does not have complaints but he note that she is about 12 years younger than he is.  Has used vacuum device but did not have good response.  He had some benefit with Cialis and is using it.   His total testosterone levels have been low on several occasions.  He was started on AndroGel 30 mg daily but did not have a good response.  This was increased to Axiron 60 mg daily but he did not continue taking it.   He is willing to try this again.  His ferritin levels were normal, rectal exam did not show any nodules in the prostate in the past.  LH was normal.  Most likely underlying etiology: Obesity and use of narcotics      Review of systems  denies n/v, SOB at rest, cough, chest pain, abd pain or diarrhea.   No dysuria, hematuria or foot ulcers.   Complete review of system was otherwise unremarkable    Diabetes Care  "  Retinopathy: none; pt seen by Oph in Dec 2016.   Nephropathy: Yes; pt's urine microalbuminuria was + in 2/2017. He is taking Lisinopril.   Neuropathy: yes.   Foot Exam:no ulcers; abnormal monofilamentous exam.   Taking aspirin:yes.   Lipids:LDL was 6 on 2/15/2017.Pt is taking Simvastatin.     Allergies   No Known Allergies     Family History   Father had type 2 diabetes.   He also has a brother with type 2 diabetes.    Social History   Smoke: none at this time; he quit smoking fall of 2015.   ETOH: rare.   .   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     Past Medical History   1. Type 2 diabetes mellitus dx 5 + years ago.   2. Neuropathy.   3. S/P bilateral knee replacement.   4. S/P left salivary cyst removed.   5. S/P hernia surgery.   6. Diverticulitis.   7. HTN.   8. Hyperlipidemia.     Physical Exam   /74  Pulse 94  Ht 1.753 m (5' 9\")  Wt 99.9 kg (220 lb 3.2 oz)  BMI 32.52 kg/m2     GENERAL : In no apparent distress, obese male  HEENT: PERRLA; fundi not examined.  Thyroid no goiter  Neck no adenopathy  LUNGS: Clear b/l.   CARDIAC: RRR.   ABDOMEN: Nontender.   EXTREMITIES: No edema.   FEET: No ulcers; hammertoes; abnormal monofilamentous exam.   Component      Latest Ref Rng & Units 1/16/2017 1/26/2017   Testosterone Total      240 - 950 ng/dL 208 (L) 197 (L)   Sex Hormone Binding Globulin      11 - 80 nmol/L  20   Lutropin      3.1 - 34.6 IU/L  9.8   Prolactin      2 - 18 ug/L  6   Ferritin      26 - 388 ng/mL  255      Component      Latest Ref Rng & Units 5/9/2017   Testosterone Total      240 - 950 ng/dL 176 (L)   Lutropin      3.1 - 34.6 IU/L 7.8   Hemoglobin      13.3 - 17.7 g/dL 14.0   ASSESSMENT/PLAN:   1. 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   Pt's A1C is good at 6.6 % today.   He is to remain on Victoza 1.8 mg SQ daily, Glucotrol 10 mg 2 tabs po BID and Metformin 1000 mg BID.     2. NEUROPATHY: Bill reports " increased numbness and tingling in both feet.   Start Gabapentin 300 mg 2 tabs each a.m. and 3 tabs at bedtime.     Add Alpha lipoic acid 600 mg daily    5. OVERWEIGHT: Encouraged pt to make healthy food choices, reduce food portions with meals and remain active.   He has lost some weight.   Pt is to remain on Victoza dose 1.8 mg SQ daily.     6. Hypogonadism and ED  We discussed the risks and benefits of using testosterone replacement therapy  Following this discussion we decided to use testosterone gel  We will start it at 60 mg daily given his poor response in the past at lower dose  We will check his baseline PSA levels.  Normal hemoglobin levels noted    7. FOOT CARE: Referred to Podiatry here. Pt has hammertoes and neuropathy.   He may benefit from being fitted with diabetic shoes/inserts.   I suggested that he be seen by Podiatry here.   8. Return to Endocrine Clinic to see me in 6 months with labs.  Testosterone levels to be drawn after he applies the gel.   Sammy Alonso MD  [id number]  Endocrinology Service

## 2017-12-06 NOTE — PROGRESS NOTES
Endocrinology progress note    Type of visit: Initial follow-up visit with me, previously seen by Dr. Weathers.     HPI   Diego Velarde is a 72 year old male with type 2 diabetes mellitus and hypogonadism. Justin was dx having type 2 diabetes 5 + years ago, complicated by neuropathy and nephropathy, No hx of retinopathy.   For his diabetes, he is currently taking Glipizide 10 mg 2 tabs BID, Metformin 1000 mg BID and Victoza 1.8 mg SQ daily. His A1C is 6.6% in October. His previous A1C was 7.1 %. AM  with SD 37  He complains of numbness in his toes and feet. He reports increased tingling in both feet at this time. He resumed Gabapentin. He has chronic back pain and some heartburn.     Hypogonadism  Justin has absent libido, poor erection. Considers having sex to be very important part of his family life.  His wife does not have complaints but he note that she is about 12 years younger than he is.  Has used vacuum device but did not have good response.  He had some benefit with Cialis and is using it.   His total testosterone levels have been low on several occasions.  He was started on AndroGel 30 mg daily but did not have a good response.  This was increased to Axiron 60 mg daily but he did not continue taking it.   He is willing to try this again.  His ferritin levels were normal, rectal exam did not show any nodules in the prostate in the past.  LH was normal.  Most likely underlying etiology: Obesity and use of narcotics      Review of systems  denies n/v, SOB at rest, cough, chest pain, abd pain or diarrhea.   No dysuria, hematuria or foot ulcers.   Complete review of system was otherwise unremarkable    Diabetes Care   Retinopathy: none; pt seen by Oph in Dec 2016.   Nephropathy: Yes; pt's urine microalbuminuria was + in 2/2017. He is taking Lisinopril.   Neuropathy: yes.   Foot Exam:no ulcers; abnormal monofilamentous exam.   Taking aspirin:yes.   Lipids:LDL was 6 on 2/15/2017.Pt is taking Simvastatin.  "    Allergies   No Known Allergies     Family History   Father had type 2 diabetes.   He also has a brother with type 2 diabetes.    Social History   Smoke: none at this time; he quit smoking fall of 2015.   ETOH: rare.   .   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     Past Medical History   1. Type 2 diabetes mellitus dx 5 + years ago.   2. Neuropathy.   3. S/P bilateral knee replacement.   4. S/P left salivary cyst removed.   5. S/P hernia surgery.   6. Diverticulitis.   7. HTN.   8. Hyperlipidemia.     Physical Exam   /74  Pulse 94  Ht 1.753 m (5' 9\")  Wt 99.9 kg (220 lb 3.2 oz)  BMI 32.52 kg/m2     GENERAL : In no apparent distress, obese male  HEENT: PERRLA; fundi not examined.  Thyroid no goiter  Neck no adenopathy  LUNGS: Clear b/l.   CARDIAC: RRR.   ABDOMEN: Nontender.   EXTREMITIES: No edema.   FEET: No ulcers; hammertoes; abnormal monofilamentous exam.   Component      Latest Ref Rng & Units 1/16/2017 1/26/2017   Testosterone Total      240 - 950 ng/dL 208 (L) 197 (L)   Sex Hormone Binding Globulin      11 - 80 nmol/L  20   Lutropin      3.1 - 34.6 IU/L  9.8   Prolactin      2 - 18 ug/L  6   Ferritin      26 - 388 ng/mL  255      Component      Latest Ref Rng & Units 5/9/2017   Testosterone Total      240 - 950 ng/dL 176 (L)   Lutropin      3.1 - 34.6 IU/L 7.8   Hemoglobin      13.3 - 17.7 g/dL 14.0     ASSESSMENT/PLAN:   1. 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   Pt's A1C is good at 6.6 % today.   He is to remain on Victoza 1.8 mg SQ daily, Glucotrol 10 mg 2 tabs po BID and Metformin 1000 mg BID.     2. NEUROPATHY: Bill reports increased numbness and tingling in both feet.   Start Gabapentin 300 mg 2 tabs each a.m. and 3 tabs at bedtime.     Add Alpha lipoic acid 600 mg daily    5. OVERWEIGHT: Encouraged pt to make healthy food choices, reduce food portions with meals and remain active.   He has lost some weight.   Pt " is to remain on Victoza dose 1.8 mg SQ daily.     6. Hypogonadism and ED  We discussed the risks and benefits of using testosterone replacement therapy  Following this discussion we decided to use testosterone gel  We will start it at 60 mg daily given his poor response in the past at lower dose  We will check his baseline PSA levels.  Normal hemoglobin levels noted    7. FOOT CARE: Referred to Podiatry here. Pt has hammertoes and neuropathy.   He may benefit from being fitted with diabetic shoes/inserts.   I suggested that he be seen by Podiatry here.   8. Return to Endocrine Clinic to see me in 6 months with labs.  Testosterone levels to be drawn after he applies the gel.   Sammy Alonso MD  [id number]  Endocrinology Service

## 2017-12-06 NOTE — MR AVS SNAPSHOT
After Visit Summary   12/6/2017    Diego Velarde    MRN: [id number]           Patient Information     Date Of Birth          1945        Visit Information        Provider Department      12/6/2017 1:00 PM Sammy Alonso MD [facility] Endocrinology        Today's Diagnoses     Male hypogonadism    -  1    Urinary frequency          Care Instructions    Start Alpha lipoic acid 600 mg oral daily, this is over the counter.     Resume testosterone.   Labs in January in the morning. Do apply testosterone prior to the labs.     FU in 6 months          Follow-ups after your visit        Follow-up notes from your care team     Return in about 6 months (around 6/6/2018).      Your next 10 appointments already scheduled     Nik 10, 2018 10:30 AM CST   (Arrive by 10:15 AM)   RETURN DIABETES with Kim Almeida PA-C   [facility] Endocrinology ([facility])    [address] [place] [id number] [phone]            Jun 04, 2018  1:30 PM CDT   (Arrive by 1:15 PM)   RETURN DIABETES with Sammy Alonso MD   [facility] Endocrinology ([facility])    [address] [place] [id number] [phone]              Future tests that were ordered for you today     Open Future Orders        Priority Expected Expires Ordered    PSA tumor marker Routine 12/6/2017 12/6/2018 12/6/2017    Testosterone Free and Total Routine 12/6/2017 12/6/2018 12/6/2017            Who to contact     Please call your clinic at [phone] to:    Ask questions about your health    Make or cancel appointments    Discuss your medicines    Learn about your test results    Speak to your doctor   If you have compliments or concerns about an experience at your clinic, or if you wish to file a complaint, please contact [facility] Patient Relations at [phone] or email us at  [email or web address]         Additional Information About Your Visit        [facility] Information     [facility] gives you secure access to your electronic health record. If you see a primary care provider, you can also send messages to your care team and make appointments. If you have questions, please call your primary care clinic.  If you do not have a primary care provider, please call [phone] and they will assist you.      [facility] is an electronic gateway that provides easy, online access to your medical records. With [facility], you can request a clinic appointment, read your test results, renew a prescription or communicate with your care team.     To access your existing account, please contact your [facility] or call [phone] for assistance.        Care EveryWhere ID     This is your Care EveryWhere ID. This could be used by other organizations to access your [place] medical records  [id number]        Your Vitals Were     Pulse Height BMI (Body Mass Index)             94 1.753 m (5' 9\") 32.52 kg/m2          Blood Pressure from Last 3 Encounters:   12/06/17 147/74   10/10/17 157/83   07/19/17 145/80    Weight from Last 3 Encounters:   12/06/17 99.9 kg (220 lb 3.2 oz)   10/10/17 99.8 kg (220 lb)   07/19/17 98.4 kg (216 lb 14.4 oz)                 Today's Medication Changes          These changes are accurate as of: 12/6/17  1:48 PM.  If you have any questions, ask your nurse or doctor.               Start taking these medicines.        Dose/Directions    testosterone 30 MG/ACT topical solution   Commonly known as:  AXIRON   Used for:  Male hypogonadism        Dose:  2 pump   Place 2 pumps (60 mg) onto the skin daily   Quantity:  90 mL   Refills:  3         These medicines have changed or have updated prescriptions.        Dose/Directions    CIALIS PO   This may have changed:  Another medication with the same name was removed. Continue taking this medication, " and follow the directions you see here.        Take by mouth as needed for erectile dysfunction   Refills:  0       GABAPENTIN PO   This may have changed:  Another medication with the same name was removed. Continue taking this medication, and follow the directions you see here.        Dose:  300 mg   Take 300 mg by mouth 2 times daily   Refills:  0       GLIPIZIDE PO   This may have changed:  Another medication with the same name was removed. Continue taking this medication, and follow the directions you see here.        Take by mouth 2 times daily   Refills:  0       PERCOCET PO   This may have changed:  Another medication with the same name was removed. Continue taking this medication, and follow the directions you see here.        Take by mouth as needed   Refills:  0       SIMVASTATIN PO   This may have changed:  Another medication with the same name was removed. Continue taking this medication, and follow the directions you see here.        Dose:  40 mg   Take 40 mg by mouth   Refills:  0       VICTOZA PEN 18 MG/3ML soln   This may have changed:  Another medication with the same name was removed. Continue taking this medication, and follow the directions you see here.   Generic drug:  liraglutide        Dose:  1.8 mg   Inject 1.8 mg Subcutaneous daily   Refills:  0         Stop taking these medicines if you haven't already. Please contact your care team if you have questions.     ACIPHEX 20 MG EC tablet   Generic drug:  RABEprazole           AMLODIPINE BESYLATE PO           BENADRYL PO           CARVEDILOL PO           clotrimazole 1 % solution   Commonly known as:  LOTRIMIN           JANUVIA PO           MULTI VITAMIN DAILY PO           PROBIOTIC ADVANCED PO           UNABLE TO FIND                Where to get your medicines      Some of these will need a paper prescription and others can be bought over the counter.  Ask your nurse if you have questions.     Bring a paper prescription for each of these  medications     testosterone 30 MG/ACT topical solution                Primary Care Provider Office Phone # Fax #    Sharmila Sauceda [phone]       [facility] [address] [facility] [address]        Equal Access to Services     POOL VICTORIA : Hadii ana m ku hadselwyno Soomaali, waaxda luqadaha, qaybta kaalmada adeegyada, aaron jordanain hayaan pardeepvíctor kasper josiane harris. So [facility] [phone].    ATENCIÓN: Si habla español, tiene a ken disposición servicios gratuitos de asistencia lingüística. Llame al [phone].    We comply with applicable federal civil rights laws and [place] laws. We do not discriminate on the basis of race, color, national origin, age, disability, sex, sexual orientation, or gender identity.            Thank you!     Thank you for choosing [facility] ENDOCRINOLOGY  for your care. Our goal is always to provide you with excellent care. Hearing back from our patients is one way we can continue to improve our services. Please take a few minutes to complete the written survey that you may receive in the mail after your visit with us. Thank you!             Your Updated Medication List - Protect others around you: Learn how to safely use, store and throw away your medicines at www.disposemymeds.org.          This list is accurate as of: 12/6/17  1:48 PM.  Always use your most recent med list.                   Brand Name Dispense Instructions for use Diagnosis    aspirin 81 MG tablet           blood glucose monitoring test strip    ACCU-CHEK SMARTVIEW    4 Box    Test blood sugar BID.    Type 2 diabetes mellitus with hyperglycemia, without long-term current use of insulin (H)       CIALIS PO      Take by mouth as needed for erectile dysfunction        GABAPENTIN PO      Take 300 mg by mouth 2 times daily        GLIPIZIDE PO      Take by mouth 2 times daily        * insulin pen needle 32G X 4 MM    BD CAMPBELL U/F    100 each    Use daily.    Essential hypertension       * insulin  pen needle 32G X 4 MM     100 each    Use daily.    Type 2 diabetes mellitus with hyperglycemia (H), Type 2 diabetes mellitus with complication (H)       LISINOPRIL PO      Take 20 mg by mouth 2 times daily        losartan-hydrochlorothiazide 100-25 MG per tablet    HYZAAR    90 tablet    Take 1 tablet by mouth daily    Essential hypertension       metFORMIN 1000 MG tablet    GLUCOPHAGE    60 tablet    Take 1 tablet twice a day - take with breakfast and supper.    Type 2 diabetes mellitus with complication (H)       MULTIVITAMIN ADULT PO           PERCOCET PO      Take by mouth as needed        SIMVASTATIN PO      Take 40 mg by mouth        testosterone 30 MG/ACT topical solution    AXIRON    90 mL    Place 2 pumps (60 mg) onto the skin daily    Male hypogonadism       VICTOZA PEN 18 MG/3ML soln   Generic drug:  liraglutide      Inject 1.8 mg Subcutaneous daily        * Notice:  This list has 2 medication(s) that are the same as other medications prescribed for you. Read the directions carefully, and ask your doctor or other care provider to review them with you.

## 2017-12-27 ASSESSMENT — ENCOUNTER SYMPTOMS
ARTHRALGIAS: 0
STIFFNESS: 0
MUSCLE WEAKNESS: 0
JOINT SWELLING: 0
NECK PAIN: 0
MUSCLE CRAMPS: 0
BACK PAIN: 1
MYALGIAS: 0

## 2018-01-10 ENCOUNTER — OFFICE VISIT (OUTPATIENT)
Dept: ENDOCRINOLOGY | Facility: CLINIC | Age: 73
End: 2018-01-10
Payer: COMMERCIAL

## 2018-01-10 VITALS
DIASTOLIC BLOOD PRESSURE: 84 MMHG | WEIGHT: 219.7 LBS | HEIGHT: 69 IN | SYSTOLIC BLOOD PRESSURE: 144 MMHG | BODY MASS INDEX: 32.54 KG/M2

## 2018-01-10 DIAGNOSIS — E11.40 TYPE 2 DIABETES MELLITUS WITH DIABETIC NEUROPATHY, WITHOUT LONG-TERM CURRENT USE OF INSULIN (H): Primary | ICD-10-CM

## 2018-01-10 LAB — HBA1C MFR BLD: 7.3 % (ref 4.3–6)

## 2018-01-10 NOTE — LETTER
1/10/2018       RE: Diego Velarde  [address] [place] [address]     Dear Colleague,    Thank you for referring your patient, Diego Velarde, to the [facility] ENDOCRINOLOGY at [facility]. Please see a copy of my visit note below.    HPI  Diego Velarde is a 72 year old male with type 2 diabetes mellitus here today for a follow up visit.  Justin was dx having type 2 diabetes 5 + years ago.  His diabetes is complicated by neuropathy and nephropathy.  He also has ED.  No hx of retinopathy.  For his diabetes, he is currently taking Glucotrol 10 mg 2 tabs BID,  Metformin 1000 mg BID and Victoza 1.8 mg SQ daily.  His A1C is 7.3 % today and his previous A1C was 6.6 %.   Justin's  A1C was 8.1 % in May 2016.  We downloaded his glucose meter today and his blood sugar values are higher.  He has seven FBS values today which are 222, 183, 161, 152, 206, 202 and 154.  No symptoms of hypoglycemia.  On ROS today, numbness and tingling in his toes and feet. He states he stopped taking gabapentin several months ago for no good reason.  He has chronic back pain.  Pt denies visual problems.  Justin denies n/v, SOB at rest, cough, chest pain, abd pain or diarrhea.  No dysuria, hematuria or foot ulcers.    Diabetes Care  Retinopathy:none; pt seen by Oph in Dec 2017.  Nephropathy:yes; pt's urine microalbuminuria was + in 2/2017.  He is taking Hyzaar.  Neuropathy:yes.   Foot Exam:no ulcers; abnormal monofilamentous exam.  Taking aspirin:yes.  Lipids:LDL was 6 on 2/15/2017.Pt is taking Simvastatin.    ROS  Please see under HPI.    Allergies  No Known Allergies    Medications  Current Outpatient Prescriptions   Medication Sig Dispense Refill     AmLODIPine Besylate (NORVASC PO) Take 5 mg by mouth       GLIPIZIDE PO Take by mouth 2 times daily       Tadalafil (CIALIS PO) Take by mouth as needed for erectile dysfunction       liraglutide (VICTOZA PEN) 18 MG/3ML soln Inject 1.8 mg Subcutaneous daily  "      Multiple Vitamins-Minerals (MULTIVITAMIN ADULT PO)        SIMVASTATIN PO Take 40 mg by mouth       Oxycodone-Acetaminophen (PERCOCET PO) Take by mouth as needed       testosterone (AXIRON) 30 MG/ACT topical solution Place 2 pumps (60 mg) onto the skin daily 90 mL 3     blood glucose monitoring (ACCU-CHEK SMARTVIEW) test strip Test blood sugar BID. 4 Box 3     losartan-hydrochlorothiazide (HYZAAR) 100-25 MG per tablet Take 1 tablet by mouth daily 90 tablet 2     insulin pen needle (BD CAMPBELL U/F) 32G X 4 MM Use daily. 100 each 3     aspirin 81 MG tablet        metFORMIN (GLUCOPHAGE) 1000 MG tablet Take 1 tablet twice a day - take with breakfast and supper. 60 tablet 11     Family History  Father had type 2 diabetes.  He also has a brother with type 2 diabetes.    Social History  Smoke: none at this time; he quit smoking fall of 2015.  ETOH: rare.  .  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    Past Medical History  1.  Type 2 diabetes mellitus dx 5 + years ago.  2.  Neuropathy.  3.  S/P bilateral knee replacement.  4.  S/P left salivary cyst removed.  5.  S/P hernia surgery.  6.  Diverticulitis.  7.  HTN.  8. Hyperlipidemia.    Physical Exam  /84 (BP Location: Right arm, Patient Position: Sitting, Cuff Size: Adult Regular)  Ht 1.753 m (5' 9\")  Wt 99.7 kg (219 lb 11.2 oz)  BMI 32.44 kg/m2  Body mass index is 32.44 kg/(m^2).    GENERAL : In no apparent distress  HEENT: PERRLA; fundi not examined.  NECK: No goiter.  LUNGS: Clear b/l.  CARDIAC: RRR.  ABDOMEN: Nontender.  EXTREMITIES: No edema.    FEET: No ulcers; hammertoes; abnormal monofilamentous exam.    RESULTS  A1C    7.3   1/10/2018  A1C    7.1   7/19/2017  A1C    7.2   4/19/2017  A1C    7.1   2/15/2017  A1C    6.7   11/17/2016  A1C    7.2   8/11/2016  A1C    8.1 %  5/2016      ASSESSMENT/PLAN:    1.  TYPE 2 DIABETES MELLITUS:  Type 2 diabetes mellitus complicated by nephropathy and neuropathy.  Pt's A1C is higher today 7.3%.  I " discussed adding Jardiance today, but Justin would like to work on his diet and exercise to see if he can improve his glycemic control and A1C.  If his blood sugar control and A1C have not improved next visit, I plan to start him on Jardiance daily.  Encouraged Justin to make healthy food choices, reduce food portions with meals, avoid snacking and increase his activity.  He is to remain on Victoza 1.8 mg SQ daily, Glucotrol 10 mg 2 tabs po BID and Metformin 1000 mg BID.  If he develops n/v or abdominal pain he is to notify me.  Again, I reviewed how Victoza works and possible side effects of the meds including n/v, GI distress, diarrhea, headaches, hypoglycemia and rare risk of pancreatitis/cancer and thyroid cancer.  Instructed pt to check his blood sugar premeals ( 3 times per day ) DAILY.  He was seen by Oph in 12/2017 without any evidence of retinopathy.  He is taking ASA daily.  TSH normal in Feb 2017.  Patient states he had the flu vaccine this season.    2. NEUROPATHY: Patient states that gabapentin has not been helpful.    Check vitamin B12 level.    3.  HTN: His PCP started him on Amlodipine 5 mg daily 2 weeks ago.  He remains on Hyzaar 100-25 mg daily.  Patient was instructed to check his blood pressure at home and send the blood pressure readings via my chart in 1 week.    4.  HYPERLIPIDEMIA: LDL 6 in 2/2017 on Simvastatin.  Hx of elevated TG.    5.  OVERWEIGHT: Encouraged pt to make healthy food choices, reduce food portions with meals and remain active.  He has lost some weight.  Pt is to remain on Victoza dose 1.8 mg SQ daily.    6. ED: Pt seen byDr. Love for ED and low libido evaluation.     7.  FOOT CARE: Referred to Podiatry here.  Pt has hammertoes and neuropathy.  He may benefit from being fitted with diabetic shoes/inserts.  I suggested that he be seen by Podiatry here.     8.  Return to Endocrine Clinic to see me in 3 months.            Again, thank you for allowing me to participate in the care  of your patient.      Sincerely,    Kim Almeida PA-C

## 2018-01-10 NOTE — MR AVS SNAPSHOT
After Visit Summary   1/10/2018    Diego Velarde    MRN: [id number]           Patient Information     Date Of Birth          1945        Visit Information        Provider Department      1/10/2018 10:30 AM Kim Almeida PA-C [facility] Endocrinology        Today's Diagnoses     Type 2 diabetes mellitus with diabetic neuropathy, without long-term current use of insulin (H)    -  1       Follow-ups after your visit        Your next 10 appointments already scheduled     Apr 03, 2018 10:00 AM CDT   (Arrive by 9:45 AM)   RETURN DIABETES with PAVEL Connell [facility] Endocrinology ([facility])    [address] [place] [id number] [phone]            Jun 04, 2018  1:30 PM CDT   (Arrive by 1:15 PM)   RETURN DIABETES with Sammy Alonso MD   [facility] Endocrinology ([facility])    [address] [place] [id number] [phone]              Future tests that were ordered for you today     Open Future Orders        Priority Expected Expires Ordered    Albumin Random Urine Quantitative with Creat Ratio Routine 1/11/2018 5/1/2018 1/10/2018    Creatinine Routine 1/11/2018 5/1/2018 1/10/2018    Lipid Profile Routine 1/11/2018 5/1/2018 1/10/2018    TSH Routine 1/11/2018 5/1/2018 1/10/2018    Potassium Routine 1/11/2018 5/1/2018 1/10/2018    AST Routine 1/11/2018 5/1/2018 1/10/2018    ALT Routine 1/11/2018 5/1/2018 1/10/2018    T4 free Routine 1/11/2018 5/1/2018 1/10/2018    CBC with platelets Routine 1/11/2018 5/1/2018 1/10/2018    Vitamin B12 Routine 1/11/2018 5/1/2018 1/10/2018            Who to contact     Please call your clinic at [phone] to:    Ask questions about your health    Make or cancel appointments    Discuss your medicines    Learn about your test results    Speak to your doctor   If you have compliments or concerns about an experience at your clinic,  "or if you wish to file a complaint, please contact [facility] Patient Relations at [phone] or email us at [email or web address]         Additional Information About Your Visit        [employer] Information     [employer] gives you secure access to your electronic health record. If you see a primary care provider, you can also send messages to your care team and make appointments. If you have questions, please call your primary care clinic.  If you do not have a primary care provider, please call [phone] and they will assist you.      [facility] is an electronic gateway that provides easy, online access to your medical records. With [facility], you can request a clinic appointment, read your test results, renew a prescription or communicate with your care team.     To access your existing account, please contact your [facility] Clinic or call [phone] for assistance.        Care EveryWhere ID     This is your Care EveryWhere ID. This could be used by other organizations to access your [place] medical records  [id number]        Your Vitals Were     Height BMI (Body Mass Index)                1.753 m (5' 9\") 32.44 kg/m2           Blood Pressure from Last 3 Encounters:   01/10/18 144/84   12/06/17 147/74   10/10/17 157/83    Weight from Last 3 Encounters:   01/10/18 99.7 kg (219 lb 11.2 oz)   12/06/17 99.9 kg (220 lb 3.2 oz)   10/10/17 99.8 kg (220 lb)                 Today's Medication Changes          These changes are accurate as of: 1/10/18  2:33 PM.  If you have any questions, ask your nurse or doctor.               These medicines have changed or have updated prescriptions.        Dose/Directions    insulin pen needle 32G X 4 MM   Commonly known as:  BD CAMPBELL U/F   This may have changed:  Another medication with the same name was removed. Continue taking this medication, and follow the directions you see here.   Used for:  Essential hypertension     "    Use daily.   Quantity:  100 each   Refills:  3         Stop taking these medicines if you haven't already. Please contact your care team if you have questions.     GABAPENTIN PO           LISINOPRIL PO                    Primary Care Provider Office Phone # Fax #    Sharmila Sauceda [phone]       [facility] [address]  [facility] [address]        Equal Access to Services     [facility] ESME : Hadii aad ku hadasho Soomaali, waaxda luqadaha, qaybta kaalmada adeegyada, waxay idiin hayaan adeeg kharash la'aan ah. So [facility] [phone].    ATENCIÓN: Si habla español, tiene a ken disposición servicios gratuitos de asistencia lingüística. Luis al [phone].    We comply with applicable federal civil rights laws and [place] laws. We do not discriminate on the basis of race, color, national origin, age, disability, sex, sexual orientation, or gender identity.            Thank you!     Thank you for choosing [facility] ENDOCRINOLOGY  for your care. Our goal is always to provide you with excellent care. Hearing back from our patients is one way we can continue to improve our services. Please take a few minutes to complete the written survey that you may receive in the mail after your visit with us. Thank you!             Your Updated Medication List - Protect others around you: Learn how to safely use, store and throw away your medicines at www.disposemymeds.org.          This list is accurate as of: 1/10/18  2:33 PM.  Always use your most recent med list.                   Brand Name Dispense Instructions for use Diagnosis    aspirin 81 MG tablet           blood glucose monitoring test strip    ACCU-CHEK SMARTVIEW    4 Box    Test blood sugar BID.    Type 2 diabetes mellitus with hyperglycemia, without long-term current use of insulin (H)       CIALIS PO      Take by mouth as needed for erectile dysfunction        GLIPIZIDE PO      Take by mouth 2 times daily        insulin pen needle 32G X  4 MM    BD CAMPBELL U/F    100 each    Use daily.    Essential hypertension       losartan-hydrochlorothiazide 100-25 MG per tablet    HYZAAR    90 tablet    Take 1 tablet by mouth daily    Essential hypertension       metFORMIN 1000 MG tablet    GLUCOPHAGE    60 tablet    Take 1 tablet twice a day - take with breakfast and supper.    Type 2 diabetes mellitus with complication (H)       MULTIVITAMIN ADULT PO           NORVASC PO      Take 5 mg by mouth        PERCOCET PO      Take by mouth as needed        SIMVASTATIN PO      Take 40 mg by mouth        testosterone 30 MG/ACT topical solution    AXIRON    90 mL    Place 2 pumps (60 mg) onto the skin daily    Male hypogonadism       VICTOZA PEN 18 MG/3ML soln   Generic drug:  liraglutide      Inject 1.8 mg Subcutaneous daily

## 2018-01-10 NOTE — PROGRESS NOTES
HPI  Diego Velarde is a 72 year old male with type 2 diabetes mellitus here today for a follow up visit.  Justin was dx having type 2 diabetes 5 + years ago.  His diabetes is complicated by neuropathy and nephropathy.  He also has ED.  No hx of retinopathy.  For his diabetes, he is currently taking Glucotrol 10 mg 2 tabs BID,  Metformin 1000 mg BID and Victoza 1.8 mg SQ daily.  His A1C is 7.3 % today and his previous A1C was 6.6 %.   Justin's  A1C was 8.1 % in May 2016.  We downloaded his glucose meter today and his blood sugar values are higher.  He has seven FBS values today which are 222, 183, 161, 152, 206, 202 and 154.  No symptoms of hypoglycemia.  On ROS today, numbness and tingling in his toes and feet. He states he stopped taking gabapentin several months ago for no good reason.  He has chronic back pain.  Pt denies visual problems.  Justin denies n/v, SOB at rest, cough, chest pain, abd pain or diarrhea.  No dysuria, hematuria or foot ulcers.    Diabetes Care  Retinopathy:none; pt seen by Oph in Dec 2017.  Nephropathy:yes; pt's urine microalbuminuria was + in 2/2017.  He is taking Hyzaar.  Neuropathy:yes.   Foot Exam:no ulcers; abnormal monofilamentous exam.  Taking aspirin:yes.  Lipids:LDL was 6 on 2/15/2017.Pt is taking Simvastatin.    ROS  Please see under HPI.    Allergies  No Known Allergies    Medications  Current Outpatient Prescriptions   Medication Sig Dispense Refill     AmLODIPine Besylate (NORVASC PO) Take 5 mg by mouth       GLIPIZIDE PO Take by mouth 2 times daily       Tadalafil (CIALIS PO) Take by mouth as needed for erectile dysfunction       liraglutide (VICTOZA PEN) 18 MG/3ML soln Inject 1.8 mg Subcutaneous daily       Multiple Vitamins-Minerals (MULTIVITAMIN ADULT PO)        SIMVASTATIN PO Take 40 mg by mouth       Oxycodone-Acetaminophen (PERCOCET PO) Take by mouth as needed       testosterone (AXIRON) 30 MG/ACT topical solution Place 2 pumps (60 mg) onto the skin daily 90 mL 3     blood  "glucose monitoring (ACCU-CHEK SMARTVIEW) test strip Test blood sugar BID. 4 Box 3     losartan-hydrochlorothiazide (HYZAAR) 100-25 MG per tablet Take 1 tablet by mouth daily 90 tablet 2     insulin pen needle (BD CAMPBELL U/F) 32G X 4 MM Use daily. 100 each 3     aspirin 81 MG tablet        metFORMIN (GLUCOPHAGE) 1000 MG tablet Take 1 tablet twice a day - take with breakfast and supper. 60 tablet 11     Family History  Father had type 2 diabetes.  He also has a brother with type 2 diabetes.    Social History  Smoke: none at this time; he quit smoking fall of 2015.  ETOH: rare.  .  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    Past Medical History  1.  Type 2 diabetes mellitus dx 5 + years ago.  2.  Neuropathy.  3.  S/P bilateral knee replacement.  4.  S/P left salivary cyst removed.  5.  S/P hernia surgery.  6.  Diverticulitis.  7.  HTN.  8. Hyperlipidemia.    Physical Exam  /84 (BP Location: Right arm, Patient Position: Sitting, Cuff Size: Adult Regular)  Ht 1.753 m (5' 9\")  Wt 99.7 kg (219 lb 11.2 oz)  BMI 32.44 kg/m2  Body mass index is 32.44 kg/(m^2).    GENERAL : In no apparent distress  HEENT: PERRLA; fundi not examined.  NECK: No goiter.  LUNGS: Clear b/l.  CARDIAC: RRR.  ABDOMEN: Nontender.  EXTREMITIES: No edema.    FEET: No ulcers; hammertoes; abnormal monofilamentous exam.    RESULTS  A1C    7.3   1/10/2018  A1C    7.1   7/19/2017  A1C    7.2   4/19/2017  A1C    7.1   2/15/2017  A1C    6.7   11/17/2016  A1C    7.2   8/11/2016  A1C    8.1 %  5/2016      ASSESSMENT/PLAN:    1.  TYPE 2 DIABETES MELLITUS:  Type 2 diabetes mellitus complicated by nephropathy and neuropathy.  Pt's A1C is higher today 7.3%.  I discussed adding Jardiance today, but Justin would like to work on his diet and exercise to see if he can improve his glycemic control and A1C.  If his blood sugar control and A1C have not improved next visit, I plan to start him on Jardiance daily.  Encouraged Justin to make healthy food " choices, reduce food portions with meals, avoid snacking and increase his activity.  He is to remain on Victoza 1.8 mg SQ daily, Glucotrol 10 mg 2 tabs po BID and Metformin 1000 mg BID.  If he develops n/v or abdominal pain he is to notify me.  Again, I reviewed how Victoza works and possible side effects of the meds including n/v, GI distress, diarrhea, headaches, hypoglycemia and rare risk of pancreatitis/cancer and thyroid cancer.  Instructed pt to check his blood sugar premeals ( 3 times per day ) DAILY.  He was seen by Oph in 12/2017 without any evidence of retinopathy.  He is taking ASA daily.  TSH normal in Feb 2017.  Patient states he had the flu vaccine this season.    2. NEUROPATHY: Patient states that gabapentin has not been helpful.    Check vitamin B12 level.    3.  HTN: His PCP started him on Amlodipine 5 mg daily 2 weeks ago.  He remains on Hyzaar 100-25 mg daily.  Patient was instructed to check his blood pressure at home and send the blood pressure readings via my chart in 1 week.    4.  HYPERLIPIDEMIA: LDL 6 in 2/2017 on Simvastatin.  Hx of elevated TG.    5.  OVERWEIGHT: Encouraged pt to make healthy food choices, reduce food portions with meals and remain active.  He has lost some weight.  Pt is to remain on Victoza dose 1.8 mg SQ daily.    6. ED: Pt seen byDr. Love for ED and low libido evaluation.     7.  FOOT CARE: Referred to Podiatry here.  Pt has hammertoes and neuropathy.  He may benefit from being fitted with diabetic shoes/inserts.  I suggested that he be seen by Podiatry here.     8.  Return to Endocrine Clinic to see me in 3 months.

## 2018-01-10 NOTE — NURSING NOTE
"Chief Complaint   Patient presents with     RECHECK     DIABETES TYPE 2 F/U        Initial /84 (BP Location: Right arm, Patient Position: Sitting, Cuff Size: Adult Regular)  Ht 1.753 m (5' 9\")  Wt 99.7 kg (219 lb 11.2 oz)  BMI 32.44 kg/m2 Estimated body mass index is 32.44 kg/(m^2) as calculated from the following:    Height as of this encounter: 1.753 m (5' 9\").    Weight as of this encounter: 99.7 kg (219 lb 11.2 oz).  Medication Reconciliation: completecomplete    Performed A1C test - patient tolerated well.    Sophy [facility]       "

## 2018-01-12 ENCOUNTER — MYC MEDICAL ADVICE (OUTPATIENT)
Dept: ENDOCRINOLOGY | Facility: CLINIC | Age: 73
End: 2018-01-12

## 2018-01-12 DIAGNOSIS — E29.1 HYPOGONADISM MALE: Primary | ICD-10-CM

## 2018-01-18 ENCOUNTER — TELEPHONE (OUTPATIENT)
Dept: ENDOCRINOLOGY | Facility: CLINIC | Age: 73
End: 2018-01-18

## 2018-01-18 RX ORDER — TESTOSTERONE 1.62 MG/G
3 GEL TRANSDERMAL DAILY
Qty: 75 G | Refills: 3 | Status: SHIPPED | OUTPATIENT
Start: 2018-01-18 | End: 2018-03-01

## 2018-01-18 NOTE — TELEPHONE ENCOUNTER
Spoke w/ PharmD Skip at [facility] to call in verbal for Androgel, which was confirmed.    testosterone (ANDROGEL 1.62% PUMP) 20.25 MG/ACT gel 75 g 3 1/18/2018  --   Sig: Place 3 pumps (60.75 mg) onto the skin daily

## 2018-01-18 NOTE — TELEPHONE ENCOUNTER
Received notice from [facility] script  that Androgel pump was approved  10/20/17- 1/18/2019  Member ID # [id number]

## 2018-01-19 ENCOUNTER — TELEPHONE (OUTPATIENT)
Dept: ENDOCRINOLOGY | Facility: CLINIC | Age: 73
End: 2018-01-19

## 2018-01-19 NOTE — TELEPHONE ENCOUNTER
"PA initiated for Androgel    Spoke w/ Pharmacist \"Corey\" - who stated that the testosterone cypionate injection may be covered by Pts insurance if PA for androgel was not successful.     "

## 2018-02-17 ENCOUNTER — MYC MEDICAL ADVICE (OUTPATIENT)
Dept: ENDOCRINOLOGY | Facility: CLINIC | Age: 73
End: 2018-02-17

## 2018-02-22 DIAGNOSIS — R35.0 URINARY FREQUENCY: ICD-10-CM

## 2018-02-22 DIAGNOSIS — E11.40 TYPE 2 DIABETES MELLITUS WITH DIABETIC NEUROPATHY, WITHOUT LONG-TERM CURRENT USE OF INSULIN (H): ICD-10-CM

## 2018-02-22 DIAGNOSIS — E29.1 MALE HYPOGONADISM: ICD-10-CM

## 2018-02-22 LAB
ALT SERPL W P-5'-P-CCNC: 32 U/L (ref 0–70)
AST SERPL W P-5'-P-CCNC: 24 U/L (ref 0–45)
CHOLEST SERPL-MCNC: 105 MG/DL
CREAT SERPL-MCNC: 0.9 MG/DL (ref 0.66–1.25)
CREAT UR-MCNC: 29 MG/DL
ERYTHROCYTE [DISTWIDTH] IN BLOOD BY AUTOMATED COUNT: 13.2 % (ref 10–15)
GFR SERPL CREATININE-BSD FRML MDRD: 83 ML/MIN/1.7M2
HCT VFR BLD AUTO: 41.5 % (ref 40–53)
HDLC SERPL-MCNC: 33 MG/DL
HGB BLD-MCNC: 13.8 G/DL (ref 13.3–17.7)
LDLC SERPL CALC-MCNC: 2 MG/DL
MCH RBC QN AUTO: 29.4 PG (ref 26.5–33)
MCHC RBC AUTO-ENTMCNC: 33.3 G/DL (ref 31.5–36.5)
MCV RBC AUTO: 88 FL (ref 78–100)
MICROALBUMIN UR-MCNC: 15 MG/L
MICROALBUMIN/CREAT UR: 51.56 MG/G CR (ref 0–17)
NONHDLC SERPL-MCNC: 72 MG/DL
PLATELET # BLD AUTO: 233 10E9/L (ref 150–450)
POTASSIUM SERPL-SCNC: 4.2 MMOL/L (ref 3.4–5.3)
PSA SERPL-MCNC: 2.09 UG/L (ref 0–4)
RBC # BLD AUTO: 4.7 10E12/L (ref 4.4–5.9)
T4 FREE SERPL-MCNC: 0.98 NG/DL (ref 0.76–1.46)
TRIGL SERPL-MCNC: 351 MG/DL
TSH SERPL DL<=0.005 MIU/L-ACNC: 1.08 MU/L (ref 0.4–4)
VIT B12 SERPL-MCNC: 426 PG/ML (ref 193–986)
WBC # BLD AUTO: 8.1 10E9/L (ref 4–11)

## 2018-02-24 ENCOUNTER — MYC MEDICAL ADVICE (OUTPATIENT)
Dept: ENDOCRINOLOGY | Facility: CLINIC | Age: 73
End: 2018-02-24

## 2018-02-24 LAB
SHBG SERPL-SCNC: 21 NMOL/L (ref 11–80)
TESTOST FREE SERPL-MCNC: 3.18 NG/DL (ref 4.7–24.4)
TESTOST SERPL-MCNC: 135 NG/DL (ref 240–950)

## 2018-02-26 ENCOUNTER — MYC MEDICAL ADVICE (OUTPATIENT)
Dept: ENDOCRINOLOGY | Facility: CLINIC | Age: 73
End: 2018-02-26

## 2018-03-01 ENCOUNTER — MYC MEDICAL ADVICE (OUTPATIENT)
Dept: ENDOCRINOLOGY | Facility: CLINIC | Age: 73
End: 2018-03-01

## 2018-03-01 DIAGNOSIS — E29.1 HYPOGONADISM MALE: ICD-10-CM

## 2018-03-01 RX ORDER — TESTOSTERONE 1.62 MG/G
4 GEL TRANSDERMAL DAILY
Qty: 75 G | Refills: 3 | Status: SHIPPED | OUTPATIENT
Start: 2018-03-01 | End: 2019-06-10

## 2018-04-18 ENCOUNTER — RECORDS - HEALTHEAST (OUTPATIENT)
Dept: LAB | Facility: CLINIC | Age: 73
End: 2018-04-18

## 2018-04-18 LAB
ANION GAP SERPL CALCULATED.3IONS-SCNC: 9 MMOL/L (ref 5–18)
BUN SERPL-MCNC: 17 MG/DL (ref 8–28)
CALCIUM SERPL-MCNC: 9.7 MG/DL (ref 8.5–10.5)
CHLORIDE BLD-SCNC: 102 MMOL/L (ref 98–107)
CHOLEST SERPL-MCNC: 133 MG/DL
CO2 SERPL-SCNC: 27 MMOL/L (ref 22–31)
CREAT SERPL-MCNC: 0.97 MG/DL (ref 0.7–1.3)
FASTING STATUS PATIENT QL REPORTED: ABNORMAL
GFR SERPL CREATININE-BSD FRML MDRD: >60 ML/MIN/1.73M2
GLUCOSE BLD-MCNC: 150 MG/DL (ref 70–125)
HDLC SERPL-MCNC: 32 MG/DL
LDLC SERPL CALC-MCNC: 51 MG/DL
LDLC SERPL CALC-MCNC: ABNORMAL MG/DL
POTASSIUM BLD-SCNC: 4.7 MMOL/L (ref 3.5–5)
SODIUM SERPL-SCNC: 138 MMOL/L (ref 136–145)
TRIGL SERPL-MCNC: 464 MG/DL

## 2018-04-19 LAB — HCV AB SERPL QL IA: NEGATIVE

## 2018-06-04 ENCOUNTER — OFFICE VISIT (OUTPATIENT)
Dept: ENDOCRINOLOGY | Facility: CLINIC | Age: 73
End: 2018-06-04
Payer: COMMERCIAL

## 2018-06-04 ENCOUNTER — TELEPHONE (OUTPATIENT)
Dept: ENDOCRINOLOGY | Facility: CLINIC | Age: 73
End: 2018-06-04

## 2018-06-04 VITALS
DIASTOLIC BLOOD PRESSURE: 72 MMHG | HEIGHT: 69 IN | HEART RATE: 80 BPM | SYSTOLIC BLOOD PRESSURE: 122 MMHG | WEIGHT: 219.3 LBS | BODY MASS INDEX: 32.48 KG/M2

## 2018-06-04 DIAGNOSIS — E29.1 MALE HYPOGONADISM: Primary | ICD-10-CM

## 2018-06-04 DIAGNOSIS — E11.49 TYPE 2 DIABETES MELLITUS WITH OTHER NEUROLOGIC COMPLICATION, WITHOUT LONG-TERM CURRENT USE OF INSULIN (H): ICD-10-CM

## 2018-06-04 LAB — HBA1C MFR BLD: 7.5 % (ref 4.3–6)

## 2018-06-04 RX ORDER — TESTOSTERONE CYPIONATE 200 MG/ML
150 INJECTION, SOLUTION INTRAMUSCULAR
Qty: 9 ML | Refills: 3 | Status: SHIPPED | OUTPATIENT
Start: 2018-06-04 | End: 2018-12-28

## 2018-06-04 RX ORDER — ATORVASTATIN CALCIUM 20 MG/1
20 TABLET, FILM COATED ORAL DAILY
Refills: 3 | COMMUNITY
Start: 2018-04-24

## 2018-06-04 NOTE — NURSING NOTE
Chief Complaint   Patient presents with     RECHECK     Type II Diabetes f/u      Chuy Callaway, CMA

## 2018-06-04 NOTE — PROGRESS NOTES
Endocrinology progress note      ASSESSMENT/PLAN:   1. 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   Pt's A1C is good at 7.4 % today. Possibly related to inactivity  He is to remain on Victoza 1.8 mg SQ daily, Glucotrol 10 mg 2 tabs po BID and Metformin 1000 mg BID.     2. NEUROPATHY: Justin reports increased numbness and tingling in both feet.   Gabapentin 300 mg 2 tabs each a.m. and 3 tabs at bedtime.   Add Alpha lipoic acid 600 mg daily    5. OVERWEIGHT: Encouraged pt to make healthy food choices, reduce food portions with meals and remain active.   He has lost some weight.   Pt is to remain on Victoza dose 1.8 mg SQ daily.     6. Hypogonadism and ED  testosterone gel was v expensive.   Injection testosterone 150 mg every 2 weeks prescribed.     7. FOOT CARE: Referred to Podiatry here. Pt has hammertoes and neuropathy.   He may benefit from being fitted with diabetic shoes/inserts.   I suggested that he be seen by Podiatry here.   8. Return to Endocrine Clinic to see me in 6 months with labs.    Sammy Alonso MD  [id number]  Endocrinology Service      Interval history  Continues to have poor erections, low libido  Did not have any effect with testosterone gel  Viagra causing aura without headaches  No polyuria or polydipsia  No low blood sugars      HPI   Justin presents for a follow up visit. He has type 2 diabetes 5 + years ago, complicated by neuropathy and nephropathy, No hx of retinopathy.   For his diabetes, he is currently taking Glipizide 10 mg 2 tabs BID, Metformin 1000 mg BID and Victoza 1.8 mg SQ daily.   He complains of numbness in his toes and feet. He reports increased tingling in both feet at this time. He resumed Gabapentin. He has chronic back pain and some heartburn.     Hypogonadism  Justin has absent libido, poor erection. Considers having sex to be very important part of his family life.  His wife does not have complaints but he note that she is  "about 12 years younger than he is.  Has used vacuum device but did not have good response.  He had some benefit with Cialis -causes heartburn.  Viagra is helpful.  However he has remote history of migraine headaches with aura and with the use of Viagra, he had aura without headache  His total testosterone levels have been low on several occasions.  Testosterone gel preparations were expensive and ineffective.  His ferritin levels were normal, rectal exam did not show any nodules in the prostate in the past.  LH was normal.  Most likely underlying etiology: Obesity and use of narcotics    Review of systems  denies n/v, SOB at rest, cough, chest pain, abd pain or diarrhea.   No dysuria, hematuria or foot ulcers.   Complete review of system was otherwise unremarkable    Diabetes Care   Retinopathy: none; pt seen by Oph in Dec 2016.   Nephropathy: Yes; pt's urine microalbuminuria was + in 2/2017. He is taking Lisinopril.   Neuropathy: yes.   Foot Exam:no ulcers; abnormal monofilamentous exam.   Taking aspirin:yes.   Lipids:LDL was 6 on 2/15/2017.Pt is taking Simvastatin.     Allergies   No Known Allergies     Family History   Father had type 2 diabetes.   He also has a brother with type 2 diabetes.    Social History   Smoke: none at this time; he quit smoking fall of 2015.   ETOH: rare.   .   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     Past Medical History   1. Type 2 diabetes mellitus dx 5 + years ago.   2. Neuropathy.   3. S/P bilateral knee replacement.   4. S/P left salivary cyst removed.   5. S/P hernia surgery.   6. Diverticulitis.   7. HTN.   8. Hyperlipidemia.     Physical Exam   /72 (BP Location: Right arm, Patient Position: Sitting)  Pulse 80  Ht 1.753 m (5' 9\")  Wt 99.5 kg (219 lb 4.8 oz)  BMI 32.38 kg/m2     GENERAL : In no apparent distress, obese male  HEENT: PERRLA; fundi not examined.  Thyroid no goiter  Neck no adenopathy  LUNGS: Clear b/l.   CARDIAC: RRR.   ABDOMEN: " Nontender.   EXTREMITIES: No edema.   FEET: No ulcers; hammertoes; abnormal monofilamentous exam.  Diminished vibratory sensation.    ENDO DIABETES Latest Ref Rng & Units 2/22/2018 6/4/2018   HEMOGLOBIN A1C, POC 4.3 - 6.0 %  7.5 (A)   HGB 13.3 - 17.7 g/dL 13.8    CHOLESTEROL <200 mg/dL 105    LDL CHOLESTEROL, CALCULATED <100 mg/dL 2    HDL CHOLESTEROL >39 mg/dL 33 (L)    NON HDL CHOLESTEROL <130 mg/dL 72    TRIGLYCERIDES <150 mg/dL 351 (H)    ALBUMIN URINE MG/L mg/L 15    ALBUMIN URINE MG/G CR 0 - 17 mg/g Cr 51.56 (H)    CREATININE 0.66 - 1.25 mg/dL 0.90    POTASSIUM 3.4 - 5.3 mmol/L 4.2    ALT 0 - 70 U/L 32    AST 0 - 45 U/L 24    WBC 4.0 - 11.0 10e9/L 8.1    RBC 4.4 - 5.9 10e12/L 4.70    HGB 13.3 - 17.7 g/dL 13.8    HCT 40.0 - 53.0 % 41.5     - 450 10e9/L 233

## 2018-06-04 NOTE — MR AVS SNAPSHOT
After Visit Summary   6/4/2018    Diego Velarde    MRN: [id number]           Patient Information     Date Of Birth          1945        Visit Information        Provider Department      6/4/2018 1:30 PM Sammy Alonso MD [facility] Endocrinology        Today's Diagnoses     Male hypogonadism    -  1    Type 2 diabetes mellitus with other neurologic complication, without long-term current use of insulin (H)          Care Instructions    Start Alpha Lipoic acid 600 mg daily. This is over the counter.     Start Testosterone injection 150 mg every 2 weeks. Retest in 3 months.     Labs in 3 months.     FU in 6 months.           Follow-ups after your visit        Follow-up notes from your care team     Return in about 6 months (around 12/4/2018).      Your next 10 appointments already scheduled     Dec 05, 2018  1:00 PM CST   (Arrive by 12:45 PM)   RETURN DIABETES with Sammy Aolnso MD   [facility] Endocrinology ([facility])    [address] [place] [id number] [phone]              Who to contact     Please call your clinic at [phone] to:    Ask questions about your health    Make or cancel appointments    Discuss your medicines    Learn about your test results    Speak to your doctor            Additional Information About Your Visit        [employer] Medical Information     [employer] gives you secure access to your electronic health record. If you see a primary care provider, you can also send messages to your care team and make appointments. If you have questions, please call your primary care clinic.  If you do not have a primary care provider, please call [phone] and they will assist you.      [employer] is an electronic gateway that provides easy, online access to your medical records. With [employer], you can request a clinic appointment, read your test results, renew a prescription or communicate with your care team.    "  To access your existing account, please contact your [facility] or call [phone] for assistance.        Care EveryWhere ID     This is your Care EveryWhere ID. This could be used by other organizations to access your [facility] medical records  [id number]        Your Vitals Were     Pulse Height BMI (Body Mass Index)             80 1.753 m (5' 9\") 32.38 kg/m2          Blood Pressure from Last 3 Encounters:   06/04/18 122/72   01/10/18 144/84   12/06/17 147/74    Weight from Last 3 Encounters:   06/04/18 99.5 kg (219 lb 4.8 oz)   01/10/18 99.7 kg (219 lb 11.2 oz)   12/06/17 99.9 kg (220 lb 3.2 oz)              We Performed the Following     Hemoglobin A1c POCT          Today's Medication Changes          These changes are accurate as of 6/4/18 11:59 PM.  If you have any questions, ask your nurse or doctor.               Start taking these medicines.        Dose/Directions    syringe/needle (disp) 25G X 1\" 3 ML Misc   Used for:  Male hypogonadism   Started by:  Sammy Alonso MD        Use every 2 weeks with testosterone or as directed.   Quantity:  50 each   Refills:  1       testosterone cypionate 200 MG/ML injection   Commonly known as:  DEPOTESTOTERONE   Used for:  Male hypogonadism   Started by:  Sammy Alonso MD        Dose:  150 mg   Inject 0.75 mLs (150 mg) into the muscle every 14 days   Quantity:  9 mL   Refills:  3            Where to get your medicines      These medications were sent to [facility]/pharmacy #[id number] [address] [address]     Phone:  [phone]     syringe/needle (disp) 25G X 1\" 3 ML Misc         Some of these will need a paper prescription and others can be bought over the counter.  Ask your nurse if you have questions.     Bring a paper prescription for each of these medications     testosterone cypionate 200 MG/ML injection                Primary Care Provider Office " Phone # Fax #    Sharmila Sauceda [phone]       [facility] [address] [facility] [address]        Equal Access to Services     POOL VICTORIA : Hadii ana m ku hadselwyno Soeliezerali, waaxda luqadaha, qaybta kaalmada adeegyada, aaron kasper laParmjittunde harris. So [facility] [phone].    ATENCIÓN: Si habla español, tiene a ken disposición servicios gratuitos de asistencia lingüística. Yonnyame al [phone].    We comply with applicable federal civil rights laws and [place] laws. We do not discriminate on the basis of race, color, national origin, age, disability, sex, sexual orientation, or gender identity.            Thank you!     Thank you for choosing [facility] ENDOCRINOLOGY  for your care. Our goal is always to provide you with excellent care. Hearing back from our patients is one way we can continue to improve our services. Please take a few minutes to complete the written survey that you may receive in the mail after your visit with us. Thank you!             Your Updated Medication List - Protect others around you: Learn how to safely use, store and throw away your medicines at www.disposemymeds.org.          This list is accurate as of 6/4/18 11:59 PM.  Always use your most recent med list.                   Brand Name Dispense Instructions for use Diagnosis    aspirin 81 MG tablet           atorvastatin 20 MG tablet    LIPITOR     Take 20 mg by mouth daily        blood glucose monitoring test strip    ACCU-CHEK SMARTVIEW    4 Box    Test blood sugar BID.    Type 2 diabetes mellitus with hyperglycemia, without long-term current use of insulin (H)       CIALIS PO      Take by mouth as needed for erectile dysfunction        GLIPIZIDE PO      Take by mouth 2 times daily        insulin pen needle 32G X 4 MM    BD CAMPBELL U/F    100 each    Use daily.    Essential hypertension       losartan-hydrochlorothiazide 100-25 MG per tablet    HYZAAR    90 tablet    Take 1 tablet by mouth  "daily    Essential hypertension       metFORMIN 1000 MG tablet    GLUCOPHAGE    60 tablet    Take 1 tablet twice a day - take with breakfast and supper.    Type 2 diabetes mellitus with complication (H)       MULTIVITAMIN ADULT PO           NORVASC PO      Take 5 mg by mouth        PERCOCET PO      Take by mouth as needed        SIMVASTATIN PO      Take 40 mg by mouth        syringe/needle (disp) 25G X 1\" 3 ML Misc     50 each    Use every 2 weeks with testosterone or as directed.    Male hypogonadism       testosterone 20.25 MG/ACT gel    ANDROGEL 1.62% PUMP    75 g    Place 4 pumps (81 mg) onto the skin daily    Hypogonadism male       testosterone cypionate 200 MG/ML injection    DEPOTESTOTERONE    9 mL    Inject 0.75 mLs (150 mg) into the muscle every 14 days    Male hypogonadism       VICTOZA PEN 18 MG/3ML soln   Generic drug:  liraglutide      Inject 1.8 mg Subcutaneous daily          "

## 2018-06-04 NOTE — PATIENT INSTRUCTIONS
Start Alpha Lipoic acid 600 mg daily. This is over the counter.     Start Testosterone injection 150 mg every 2 weeks. Retest in 3 months.     Labs in 3 months.     FU in 6 months.

## 2018-06-04 NOTE — LETTER
6/4/2018       RE: Diego Velarde  [address] [place] [address]     Dear Colleague,    Thank you for referring your patient, Diego Velarde, to the [facility] ENDOCRINOLOGY at [facility] Please see a copy of my visit note below.     Endocrinology progress note      ASSESSMENT/PLAN:   1. 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   Pt's A1C is good at 7.4 % today. Possibly related to inactivity  He is to remain on Victoza 1.8 mg SQ daily, Glucotrol 10 mg 2 tabs po BID and Metformin 1000 mg BID.     2. NEUROPATHY: Justin reports increased numbness and tingling in both feet.   Gabapentin 300 mg 2 tabs each a.m. and 3 tabs at bedtime.   Add Alpha lipoic acid 600 mg daily    5. OVERWEIGHT: Encouraged pt to make healthy food choices, reduce food portions with meals and remain active.   He has lost some weight.   Pt is to remain on Victoza dose 1.8 mg SQ daily.     6. Hypogonadism and ED  testosterone gel was v expensive.   Injection testosterone 150 mg every 2 weeks prescribed.     7. FOOT CARE: Referred to Podiatry here. Pt has hammertoes and neuropathy.   He may benefit from being fitted with diabetic shoes/inserts.   I suggested that he be seen by Podiatry here.   8. Return to Endocrine Clinic to see me in 6 months with labs.    Sammy Alonso MD  [id number]  Endocrinology Service      Interval history  Continues to have poor erections, low libido  Did not have any effect with testosterone gel  Viagra causing aura without headaches  No polyuria or polydipsia  No low blood sugars      HPI   Justin presents for a follow up visit. He has type 2 diabetes 5 + years ago, complicated by neuropathy and nephropathy, No hx of retinopathy.   For his diabetes, he is currently taking Glipizide 10 mg 2 tabs BID, Metformin 1000 mg BID and Victoza 1.8 mg SQ daily.   He complains of numbness in his toes and feet. He reports increased  tingling in both feet at this time. He resumed Gabapentin. He has chronic back pain and some heartburn.     Hypogonadism  Bill has absent libido, poor erection. Considers having sex to be very important part of his family life.  His wife does not have complaints but he note that she is about 12 years younger than he is.  Has used vacuum device but did not have good response.  He had some benefit with Cialis -causes heartburn.  Viagra is helpful.  However he has remote history of migraine headaches with aura and with the use of Viagra, he had aura without headache  His total testosterone levels have been low on several occasions.  Testosterone gel preparations were expensive and ineffective.  His ferritin levels were normal, rectal exam did not show any nodules in the prostate in the past.  LH was normal.  Most likely underlying etiology: Obesity and use of narcotics    Review of systems  denies n/v, SOB at rest, cough, chest pain, abd pain or diarrhea.   No dysuria, hematuria or foot ulcers.   Complete review of system was otherwise unremarkable    Diabetes Care   Retinopathy: none; pt seen by Oph in Dec 2016.   Nephropathy: Yes; pt's urine microalbuminuria was + in 2/2017. He is taking Lisinopril.   Neuropathy: yes.   Foot Exam:no ulcers; abnormal monofilamentous exam.   Taking aspirin:yes.   Lipids:LDL was 6 on 2/15/2017.Pt is taking Simvastatin.     Allergies   No Known Allergies     Family History   Father had type 2 diabetes.   He also has a brother with type 2 diabetes.    Social History   Smoke: none at this time; he quit smoking fall of 2015.   ETOH: rare.   .   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     Past Medical History   1. Type 2 diabetes mellitus dx 5 + years ago.   2. Neuropathy.   3. S/P bilateral knee replacement.   4. S/P left salivary cyst removed.   5. S/P hernia surgery.   6. Diverticulitis.   7. HTN.   8. Hyperlipidemia.     Physical Exam   /72 (BP Location: Right  "arm, Patient Position: Sitting)  Pulse 80  Ht 1.753 m (5' 9\")  Wt 99.5 kg (219 lb 4.8 oz)  BMI 32.38 kg/m2     GENERAL : In no apparent distress, obese male  HEENT: PERRLA; fundi not examined.  Thyroid no goiter  Neck no adenopathy  LUNGS: Clear b/l.   CARDIAC: RRR.   ABDOMEN: Nontender.   EXTREMITIES: No edema.   FEET: No ulcers; hammertoes; abnormal monofilamentous exam.  Diminished vibratory sensation.    ENDO DIABETES Latest Ref Rng & Units 2/22/2018 6/4/2018   HEMOGLOBIN A1C, POC 4.3 - 6.0 %  7.5 (A)   HGB 13.3 - 17.7 g/dL 13.8    CHOLESTEROL <200 mg/dL 105    LDL CHOLESTEROL, CALCULATED <100 mg/dL 2    HDL CHOLESTEROL >39 mg/dL 33 (L)    NON HDL CHOLESTEROL <130 mg/dL 72    TRIGLYCERIDES <150 mg/dL 351 (H)    ALBUMIN URINE MG/L mg/L 15    ALBUMIN URINE MG/G CR 0 - 17 mg/g Cr 51.56 (H)    CREATININE 0.66 - 1.25 mg/dL 0.90    POTASSIUM 3.4 - 5.3 mmol/L 4.2    ALT 0 - 70 U/L 32    AST 0 - 45 U/L 24    WBC 4.0 - 11.0 10e9/L 8.1    RBC 4.4 - 5.9 10e12/L 4.70    HGB 13.3 - 17.7 g/dL 13.8    HCT 40.0 - 53.0 % 41.5     - 450 10e9/L 233        Again, thank you for allowing me to participate in the care of your patient.      Sincerely,    Sammy Alonso MD      "

## 2018-06-04 NOTE — TELEPHONE ENCOUNTER
Nurse teaching given on testosterone IM injection and the patient expresses understanding and acceptance of instructions. Brigida Power 6/4/2018 2:22 PM

## 2018-07-09 DIAGNOSIS — E11.9 DIABETES MELLITUS, TYPE 2 (H): Primary | ICD-10-CM

## 2018-07-09 RX ORDER — LIRAGLUTIDE 6 MG/ML
INJECTION SUBCUTANEOUS
Qty: 27 ML | Refills: 1 | Status: SHIPPED | OUTPATIENT
Start: 2018-07-09 | End: 2018-07-26

## 2018-07-26 DIAGNOSIS — E11.9 DIABETES MELLITUS, TYPE 2 (H): ICD-10-CM

## 2018-07-26 RX ORDER — LIRAGLUTIDE 6 MG/ML
INJECTION SUBCUTANEOUS
Qty: 36 ML | Refills: 1 | Status: SHIPPED | OUTPATIENT
Start: 2018-07-26 | End: 2018-12-17

## 2018-07-31 DIAGNOSIS — E11.9 DIABETES MELLITUS, TYPE 2 (H): Primary | ICD-10-CM

## 2018-08-13 DIAGNOSIS — I10 ESSENTIAL HYPERTENSION: ICD-10-CM

## 2018-08-14 RX ORDER — LOSARTAN POTASSIUM AND HYDROCHLOROTHIAZIDE 25; 100 MG/1; MG/1
1 TABLET ORAL DAILY
Qty: 90 TABLET | Refills: 1 | Status: SHIPPED | OUTPATIENT
Start: 2018-08-14 | End: 2019-02-06

## 2018-08-15 ENCOUNTER — MYC MEDICAL ADVICE (OUTPATIENT)
Dept: ENDOCRINOLOGY | Facility: CLINIC | Age: 73
End: 2018-08-15

## 2018-08-17 DIAGNOSIS — E29.1 HYPOGONADISM MALE: ICD-10-CM

## 2018-08-21 LAB — TESTOST SERPL-MCNC: 295 NG/DL (ref 240–950)

## 2018-10-22 ENCOUNTER — MYC MEDICAL ADVICE (OUTPATIENT)
Dept: ENDOCRINOLOGY | Facility: CLINIC | Age: 73
End: 2018-10-22

## 2018-10-22 DIAGNOSIS — N52.01 ERECTILE DYSFUNCTION DUE TO ARTERIAL INSUFFICIENCY: Primary | ICD-10-CM

## 2018-11-26 DIAGNOSIS — N52.01 ERECTILE DYSFUNCTION DUE TO ARTERIAL INSUFFICIENCY: ICD-10-CM

## 2018-11-27 LAB
SHBG SERPL-SCNC: 14 NMOL/L (ref 11–80)
TESTOST FREE SERPL-MCNC: 12.79 NG/DL (ref 4.7–24.4)
TESTOST SERPL-MCNC: 424 NG/DL (ref 240–950)

## 2018-11-27 NOTE — PROGRESS NOTES
Normal testosterone levels are noted. No changes are needed at this time.   Best regards,   Sammy Alonso MD  [id number]  Endocrinology Service

## 2018-12-03 ENCOUNTER — PATIENT OUTREACH (OUTPATIENT)
Dept: CARE COORDINATION | Facility: CLINIC | Age: 73
End: 2018-12-03

## 2018-12-05 ENCOUNTER — OFFICE VISIT (OUTPATIENT)
Dept: ENDOCRINOLOGY | Facility: CLINIC | Age: 73
End: 2018-12-05
Payer: COMMERCIAL

## 2018-12-05 VITALS
SYSTOLIC BLOOD PRESSURE: 139 MMHG | BODY MASS INDEX: 33.24 KG/M2 | HEIGHT: 69 IN | HEART RATE: 64 BPM | WEIGHT: 224.4 LBS | DIASTOLIC BLOOD PRESSURE: 86 MMHG

## 2018-12-05 DIAGNOSIS — N52.9 ERECTILE DYSFUNCTION, UNSPECIFIED ERECTILE DYSFUNCTION TYPE: Primary | ICD-10-CM

## 2018-12-05 ASSESSMENT — PAIN SCALES - GENERAL: PAINLEVEL: NO PAIN (0)

## 2018-12-05 NOTE — MR AVS SNAPSHOT
"              After Visit Summary   12/5/2018    Diego Velarde    MRN: [id number]           Patient Information     Date Of Birth          1945        Visit Information        Provider Department      12/5/2018 1:00 PM Sammy Alonso MD [facility]        Today's Diagnoses     Erectile dysfunction, unspecified erectile dysfunction type    -  1      Care Instructions    Inquire insurance to see if you are better covered for Trulicity and Bydureon or Farxiga or Jardiance. We will make changes based on the cost.     Make appointment with Urology for further assistance.     Start Alphalipoic acid 600 mg oral daily.       To expedite your medication refill(s), please contact your pharmacy and have them fax a refill request to: [phone].  *Please allow 3 business days for routine medication refills.  *Please allow 5 business days for controlled substance medication refills.  --------------------  To schedule an appointment (including lab work) you can reach our clinic schedulers at [phone], or you can schedule any follow up appointment directly through [email or web address] by clicking on the \"Visits\" tab and selecting \"Schedule an Appointment.\"    To ask a question to your Endocrine care team, please send them a [email or web address] message, or reach them by phone at [phone] and press option #3.    For after-hours urgent Endocrine issues, that do not require 911, please dial [phone], and ask to speak with the Endocrinologist On-Call.    For questions related to your bill, please contact:  [place]: [phone]  [facility]: [phone]    If you do not have enough, or have no insurance for your care, or have questions about possible costs and coverage, please reach out to our [facility] Financial Counselors to discuss with them any options you may have. To reach them, please call [phone].    --------------------  Please Note: If you are active on [email or web address], all future " test results will be sent by [employer] message only and will no longer be sent by mail. You may also receive communication directly from your physician.            Follow-ups after your visit        Additional Services     UROLOGY ADULT REFERRAL       Your provider has referred you to: PREFERRED PROVIDERS: Dr Torres.     Please be aware that coverage of these services is subject to the terms and limitations of your health insurance plan.  Call member services at your health plan with any benefit or coverage questions.      Please bring the following with you to your appointment:    (1) Any X-Rays, CTs or MRIs which have been performed.  Contact the facility where they were done to arrange for  prior to your scheduled appointment.    (2) List of current medications  (3) This referral request   (4) Any documents/labs given to you for this referral                  Follow-up notes from your care team     Return in about 6 months (around 6/5/2019).      Your next 10 appointments already scheduled     Dec 14, 2018  7:40 AM CST   (Arrive by 7:25 AM)   Return Visit with Niranjan Torres MD   [facility] ([facility])    [address] [place] [address] [id number] [phone]            Liang 10, 2019  1:00 PM CDT   (Arrive by 12:45 PM)   RETURN DIABETES with Sammy Alonso MD   [facility] ([facility])    [address] [place] [address] [id number] [phone]              Who to contact     Please call your clinic at [phone] to:    Ask questions about your health    Make or cancel appointments    Discuss your medicines    Learn about your test results    Speak to your doctor            Additional Information About Your Visit        [employer] Mobile Information     [employer] gives you secure access to your electronic health record. If you see a primary care provider,  "you can also send messages to your care team and make appointments. If you have questions, please call your primary care clinic.  If you do not have a primary care provider, please call [phone] and they will assist you.      [employer] is an electronic gateway that provides easy, online access to your medical records. With [employer], you can request a clinic appointment, read your test results, renew a prescription or communicate with your care team.     To access your existing account, please contact your [facility] or call [phone] for assistance.        Care EveryWhere ID     This is your Care EveryWhere ID. This could be used by other organizations to access your [facility] medical records  [id number]        Your Vitals Were     Pulse Height BMI (Body Mass Index)             64 1.753 m (5' 9.02\") 33.12 kg/m2          Blood Pressure from Last 3 Encounters:   12/05/18 139/86   06/04/18 122/72   01/10/18 144/84    Weight from Last 3 Encounters:   12/05/18 101.8 kg (224 lb 6.4 oz)   06/04/18 99.5 kg (219 lb 4.8 oz)   01/10/18 99.7 kg (219 lb 11.2 oz)              We Performed the Following     UROLOGY ADULT REFERRAL        Primary Care Provider Office Phone # Fax #    Sharmila Sauceda PA-C [phone]       [facility] [address] [place] [address]        Equal Access to Services     [facility] : Hadii aad ku hadasho Soeliezerali, waaxda luqadaha, qaybta kaalmada adeegyada, aaron joshua . So [facility] [phone].    ATENCIÓN: Si habla español, tiene a ken disposición servicios gratuitos de asistencia lingüística. Llame al [phone].    We comply with applicable federal civil rights laws and [place] laws. We do not discriminate on the basis of race, color, national origin, age, disability, sex, sexual orientation, or gender identity.            Thank you!     Thank you for choosing [facility] ENDOCRIN[facility]  for your care. " "Our goal is always to provide you with excellent care. Hearing back from our patients is one way we can continue to improve our services. Please take a few minutes to complete the written survey that you may receive in the mail after your visit with us. Thank you!             Your Updated Medication List - Protect others around you: Learn how to safely use, store and throw away your medicines at www.disposemymeds.org.          This list is accurate as of 12/5/18  2:17 PM.  Always use your most recent med list.                   Brand Name Dispense Instructions for use Diagnosis    aspirin 81 MG tablet    ASA          atorvastatin 20 MG tablet    LIPITOR     Take 20 mg by mouth daily        blood glucose monitoring test strip    ACCU-CHEK SMARTVIEW    4 Box    Test blood sugar BID.    Type 2 diabetes mellitus with hyperglycemia, without long-term current use of insulin (H)       CIALIS PO      Take by mouth as needed for erectile dysfunction        GLIPIZIDE PO      Take by mouth 2 times daily        * insulin pen needle 32G X 4 MM miscellaneous    BD CAMPBELL U/F    100 each    Use daily.    Essential hypertension       * insulin pen needle 32G X 6 MM miscellaneous    NOVOFINE    100 each    Use daily or as directed.    Diabetes mellitus, type 2 (H)       liraglutide 18 MG/3ML solution    VICTOZA PEN    36 mL    INJECT 1.8 MG SUBCUTANEOUSLY DAILY    Diabetes mellitus, type 2 (H)       losartan-hydrochlorothiazide 100-25 MG tablet    HYZAAR    90 tablet    Take 1 tablet by mouth daily    Essential hypertension       metFORMIN 1000 MG tablet    GLUCOPHAGE    60 tablet    Take 1 tablet twice a day - take with breakfast and supper.    Type 2 diabetes mellitus with complication (H)       MULTIVITAMIN ADULT PO           NORVASC PO      Take 5 mg by mouth        PERCOCET PO      Take by mouth as needed        SIMVASTATIN PO      Take 40 mg by mouth        syringe/needle (disp) 25G X 1\" 3 ML Misc     50 each    Use every 2 " weeks with testosterone or as directed.    Male hypogonadism       testosterone 20.25 MG/ACT gel    ANDROGEL 1.62% PUMP    75 g    Place 4 pumps (81 mg) onto the skin daily    Hypogonadism male       testosterone cypionate 200 MG/ML injection    DEPOTESTOSTERONE    9 mL    Inject 0.75 mLs (150 mg) into the muscle every 14 days    Male hypogonadism       * Notice:  This list has 2 medication(s) that are the same as other medications prescribed for you. Read the directions carefully, and ask your doctor or other care provider to review them with you.

## 2018-12-05 NOTE — LETTER
12/5/2018       RE: Diego Velarde  [address] [place] [address]     Dear Colleague,    Thank you for referring your patient, Diego Velarde, to the [facility] at [facility]. Please see a copy of my visit note below.    Endocrinology progress note    Interval history:   No change in libido.   No nocturia , used to go 3 times at night before TRT.   Hair thicker in hands after TRT  No change in erection.   Cialis worked best, but had headaches  Feet: worse with neuropathy. Numb most of the time  Active: hiking, 6.5 mile hike.   Pain in the legs, on oxycodone    ASSESSMENT/PLAN:   1. 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   Pt's A1C is good at 7.6 % today.   He is to remain on Victoza 1.8 mg SQ daily, Glucotrol 10 mg 2 tabs po BID and Metformin 1000 mg BID.   He will contact insurance to see if he gets any cheaper option. I asked to inquire about GLP 1 and SGLT2  He will try to loose wt. Discussed diet and DM today.     2. NEUROPATHY: Justin reports increased numbness and tingling in both feet.   Gabapentin 300 mg 2 tabs each a.m. and 3 tabs at bedtime.   Add Alpha lipoic acid 600 mg daily    5. OVERWEIGHT: Encouraged pt to make healthy food choices, reduce food portions with meals and remain active.   He has lost some weight.   Pt is to remain on Victoza dose 1.8 mg SQ daily.     6. Hypogonadism and ED  testosterone gel was v expensive.   Injection testosterone 150 mg every 2 weeks prescribed.   Urology referral: wants to discuss injections'     7. FOOT CARE:  He may benefit from being fitted with diabetic shoes/inserts.   I suggested that he be seen by Podiatry here.     8. Return to Endocrine Clinic to see me in 6 months with labs.    Sammy Alonso MD  [id number]  Endocrinology Service          HPI   Justin presents for a follow up visit. He has type 2 diabetes 5 + years ago, complicated by neuropathy  and nephropathy, No hx of retinopathy.   For his diabetes, he is currently taking Glipizide 10 mg 2 tabs BID, Metformin 1000 mg BID and Victoza 1.8 mg SQ daily.   He complains of numbness in his toes and feet. He reports increased tingling in both feet at this time. He resumed Gabapentin. He has chronic back pain and some heartburn.     Hypogonadism  Bill has low libido, poor erection. Considers having sex to be very important part of his family life.  His wife does not have complaints but he note that she is about 12 years younger than he is.  Has used vacuum device but did not have good response.  He had some benefit with Cialis -causes heartburn.  Viagra was helpful.  However he has remote history of migraine headaches with aura and with the use of Viagra, he had aura without headache. Generic sildenafil was not helpful.   His total testosterone levels have been low on several occasions.  Testosterone gel preparations were expensive and ineffective.  His ferritin levels were normal, rectal exam did not show any nodules in the prostate in the past.  LH was normal.  Most likely underlying etiology: Obesity and use of narcotics    Review of systems  denies n/v, SOB at rest, cough, chest pain, abd pain or diarrhea.   No dysuria, hematuria or foot ulcers.   Complete review of system was otherwise unremarkable    Diabetes Care   Retinopathy: none.   Nephropathy: Yes; pt's urine microalbuminuria was + in 2/2017. He is taking Lisinopril.   Neuropathy: yes.   Foot Exam:no ulcers; abnormal monofilamentous exam.   Taking aspirin:yes.   Lipids:LDL was 6 on 2/15/2017.Pt is taking Simvastatin.     Allergies   No Known Allergies     Family History   Father had type 2 diabetes.   He also has a brother with type 2 diabetes.    Social History   Smoke: none at this time; he quit smoking fall of 2015.   ETOH: rare.   .   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     Past Medical History   1. Type 2 diabetes  "mellitus dx 5 + years ago.   2. Neuropathy.   3. S/P bilateral knee replacement.   4. S/P left salivary cyst removed.   5. S/P hernia surgery.   6. Diverticulitis.   7. HTN.   8. Hyperlipidemia.     Physical Exam   /86  Pulse 64  Ht 1.753 m (5' 9.02\")  Wt 101.8 kg (224 lb 6.4 oz)  BMI 33.12 kg/m2 GENERAL : In no apparent distress, obese male  HEENT: PERRLA; fundi not examined.  Thyroid no goiter  Neck no adenopathy  LUNGS: Clear b/l.   CARDIAC: RRR.   ABDOMEN: Nontender.   EXTREMITIES: No edema.   FEET: No ulcers; hammertoes; abnormal monofilamentous exam.  Diminished vibratory sensation.    ENDO DIABETES Latest Ref Rng & Units 2/22/2018 6/4/2018   HEMOGLOBIN A1C, POC 4.3 - 6.0 %  7.5 (A)   HGB 13.3 - 17.7 g/dL 13.8    CHOLESTEROL <200 mg/dL 105    LDL CHOLESTEROL, CALCULATED <100 mg/dL 2    HDL CHOLESTEROL >39 mg/dL 33 (L)    NON HDL CHOLESTEROL <130 mg/dL 72    TRIGLYCERIDES <150 mg/dL 351 (H)    ALBUMIN URINE MG/L mg/L 15    ALBUMIN URINE MG/G CR 0 - 17 mg/g Cr 51.56 (H)    CREATININE 0.66 - 1.25 mg/dL 0.90    POTASSIUM 3.4 - 5.3 mmol/L 4.2    ALT 0 - 70 U/L 32    AST 0 - 45 U/L 24    WBC 4.0 - 11.0 10e9/L 8.1    RBC 4.4 - 5.9 10e12/L 4.70    HGB 13.3 - 17.7 g/dL 13.8    HCT 40.0 - 53.0 % 41.5     - 450 10e9/L 233         Total testosterone normal after injections. Labs 11/26/18  Last A1c was in June  Results for OKSANA KRANTHI SOFI (MRN [id number]) as of 12/5/2018 08:09     Ref. Range 11/26/2018 10:00   Free Testosterone Calculated Latest Ref Range: 4.7 - 24.4 ng/dL 12.79   Testosterone Total Latest Ref Range: 240 - 950 ng/dL 424   Sex Hormone Binding Globulin Latest Ref Range: 11 - 80 nmol/L 14       Again, thank you for allowing me to participate in the care of your patient.      Sincerely,    Sammy Alonso MD      "

## 2018-12-05 NOTE — PATIENT INSTRUCTIONS
"Inquire insurance to see if you are better covered for Trulicity and Bydureon or Farxiga or Jardiance. We will make changes based on the cost.     Make appointment with Urology for further assistance.     Start Alphalipoic acid 600 mg oral daily.       To expedite your medication refill(s), please contact your pharmacy and have them fax a refill request to: [phone].  *Please allow 3 business days for routine medication refills.  *Please allow 5 business days for controlled substance medication refills.  --------------------  To schedule an appointment (including lab work) you can reach our clinic schedulers at [phone], or you can schedule any follow up appointment directly through [facility] by clicking on the \"Visits\" tab and selecting \"Schedule an Appointment.\"    To ask a question to your Endocrine care team, please send them a [employer] message, or reach them by phone at [phone] and press option #3.    For after-hours urgent Endocrine issues, that do not require 911, please dial [phone], and ask to speak with the Endocrinologist On-Call.    For questions related to your bill, please contact:  [place]: [phone]  [facility]: [phone]    If you do not have enough, or have no insurance for your care, or have questions about possible costs and coverage, please reach out to our [facility] Financial Counselors to discuss with them any options you may have. To reach them, please call [phone].    --------------------  Please Note: If you are active on [employer] [facility], all future test results will be sent by [employer] message only and will no longer be sent by mail. You may also receive communication directly from your physician.    "

## 2018-12-05 NOTE — PROGRESS NOTES
Endocrinology progress note    Interval history:   No change in libido.   No nocturia , used to go 3 times at night before TRT.   Hair thicker in hands after TRT  No change in erection.   Cialis worked best, but had headaches  Feet: worse with neuropathy. Numb most of the time  Active: hiking, 6.5 mile hike.   Pain in the legs, on oxycodone    ASSESSMENT/PLAN:   1. 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   Pt's A1C is good at 7.6 % today.   He is to remain on Victoza 1.8 mg SQ daily, Glucotrol 10 mg 2 tabs po BID and Metformin 1000 mg BID.   He will contact insurance to see if he gets any cheaper option. I asked to inquire about GLP 1 and SGLT2  He will try to loose wt. Discussed diet and DM today.     2. NEUROPATHY: Justin reports increased numbness and tingling in both feet.   Gabapentin 300 mg 2 tabs each a.m. and 3 tabs at bedtime.   Add Alpha lipoic acid 600 mg daily    5. OVERWEIGHT: Encouraged pt to make healthy food choices, reduce food portions with meals and remain active.   He has lost some weight.   Pt is to remain on Victoza dose 1.8 mg SQ daily.     6. Hypogonadism and ED  testosterone gel was v expensive.   Injection testosterone 150 mg every 2 weeks prescribed.   Urology referral: wants to discuss injections'     7. FOOT CARE:  He may benefit from being fitted with diabetic shoes/inserts.   I suggested that he be seen by Podiatry here.     8. Return to Endocrine Clinic to see me in 6 months with labs.    Sammy Alonso MD  14[address]  Endocrinology Service          HPI   Justin presents for a follow up visit. He has type 2 diabetes 5 + years ago, complicated by neuropathy and nephropathy, No hx of retinopathy.   For his diabetes, he is currently taking Glipizide 10 mg 2 tabs BID, Metformin 1000 mg BID and Victoza 1.8 mg SQ daily.   He complains of numbness in his toes and feet. He reports increased tingling in both feet at this time. He resumed  "Gabapentin. He has chronic back pain and some heartburn.     Hypogonadism  Bill has low libido, poor erection. Considers having sex to be very important part of his family life.  His wife does not have complaints but he note that she is about 12 years younger than he is.  Has used vacuum device but did not have good response.  He had some benefit with Cialis -causes heartburn.  Viagra was helpful.  However he has remote history of migraine headaches with aura and with the use of Viagra, he had aura without headache. Generic sildenafil was not helpful.   His total testosterone levels have been low on several occasions.  Testosterone gel preparations were expensive and ineffective.  His ferritin levels were normal, rectal exam did not show any nodules in the prostate in the past.  LH was normal.  Most likely underlying etiology: Obesity and use of narcotics    Review of systems  denies n/v, SOB at rest, cough, chest pain, abd pain or diarrhea.   No dysuria, hematuria or foot ulcers.   Complete review of system was otherwise unremarkable    Diabetes Care   Retinopathy: none.   Nephropathy: Yes; pt's urine microalbuminuria was + in 2/2017. He is taking Lisinopril.   Neuropathy: yes.   Foot Exam:no ulcers; abnormal monofilamentous exam.   Taking aspirin:yes.   Lipids:LDL was 6 on 2/15/2017.Pt is taking Simvastatin.     Allergies   No Known Allergies     Family History   Father had type 2 diabetes.   He also has a brother with type 2 diabetes.    Social History   Smoke: none at this time; he quit smoking fall of 2015.   ETOH: rare.   .   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     Past Medical History   1. Type 2 diabetes mellitus dx 5 + years ago.   2. Neuropathy.   3. S/P bilateral knee replacement.   4. S/P left salivary cyst removed.   5. S/P hernia surgery.   6. Diverticulitis.   7. HTN.   8. Hyperlipidemia.     Physical Exam   /86  Pulse 64  Ht 1.753 m (5' 9.02\")  Wt 101.8 kg (224 lb 6.4 " oz)  BMI 33.12 kg/m2 GENERAL : In no apparent distress, obese male  HEENT: PERRLA; fundi not examined.  Thyroid no goiter  Neck no adenopathy  LUNGS: Clear b/l.   CARDIAC: RRR.   ABDOMEN: Nontender.   EXTREMITIES: No edema.   FEET: No ulcers; hammertoes; abnormal monofilamentous exam.  Diminished vibratory sensation.    ENDO DIABETES Latest Ref Rng & Units 2/22/2018 6/4/2018   HEMOGLOBIN A1C, POC 4.3 - 6.0 %  7.5 (A)   HGB 13.3 - 17.7 g/dL 13.8    CHOLESTEROL <200 mg/dL 105    LDL CHOLESTEROL, CALCULATED <100 mg/dL 2    HDL CHOLESTEROL >39 mg/dL 33 (L)    NON HDL CHOLESTEROL <130 mg/dL 72    TRIGLYCERIDES <150 mg/dL 351 (H)    ALBUMIN URINE MG/L mg/L 15    ALBUMIN URINE MG/G CR 0 - 17 mg/g Cr 51.56 (H)    CREATININE 0.66 - 1.25 mg/dL 0.90    POTASSIUM 3.4 - 5.3 mmol/L 4.2    ALT 0 - 70 U/L 32    AST 0 - 45 U/L 24    WBC 4.0 - 11.0 10e9/L 8.1    RBC 4.4 - 5.9 10e12/L 4.70    HGB 13.3 - 17.7 g/dL 13.8    HCT 40.0 - 53.0 % 41.5     - 450 10e9/L 233         Total testosterone normal after injections. Labs 11/26/18  Last A1c was in June  Results for KRANTHI GANDHI (MRN [id number]) as of 12/5/2018 08:09     Ref. Range 11/26/2018 10:00   Free Testosterone Calculated Latest Ref Range: 4.7 - 24.4 ng/dL 12.79   Testosterone Total Latest Ref Range: 240 - 950 ng/dL 424   Sex Hormone Binding Globulin Latest Ref Range: 11 - 80 nmol/L 14

## 2018-12-06 ENCOUNTER — PRE VISIT (OUTPATIENT)
Dept: UROLOGY | Facility: CLINIC | Age: 73
End: 2018-12-06

## 2018-12-06 NOTE — TELEPHONE ENCOUNTER
Patient with history of ED coming in for consult with Dr. Torres. Patient previously seen by Dr. Weathers. Patient chart reviewed, no need for call, all records available and ready for appointment.

## 2018-12-14 ENCOUNTER — OFFICE VISIT (OUTPATIENT)
Dept: UROLOGY | Facility: CLINIC | Age: 73
End: 2018-12-14
Payer: COMMERCIAL

## 2018-12-14 VITALS
DIASTOLIC BLOOD PRESSURE: 82 MMHG | HEART RATE: 84 BPM | HEIGHT: 69 IN | WEIGHT: 221.6 LBS | BODY MASS INDEX: 32.82 KG/M2 | SYSTOLIC BLOOD PRESSURE: 153 MMHG

## 2018-12-14 DIAGNOSIS — N52.01 ERECTILE DYSFUNCTION DUE TO ARTERIAL INSUFFICIENCY: Primary | ICD-10-CM

## 2018-12-14 ASSESSMENT — PAIN SCALES - GENERAL: PAINLEVEL: NO PAIN (0)

## 2018-12-14 ASSESSMENT — MIFFLIN-ST. JEOR: SCORE: 1745.55

## 2018-12-14 NOTE — NURSING NOTE
"Chief Complaint   Patient presents with     Consult     Patient with history of ED coming in for consult with Dr. Torres. Patient previously seen by Dr. Weathers.       Blood pressure 153/82, pulse 84, height 1.753 m (5' 9\"), weight 100.5 kg (221 lb 9.6 oz). Body mass index is 32.72 kg/m .    Patient Active Problem List   Diagnosis     ED (erectile dysfunction)     Sciatic pain     Diabetes mellitus, type 2 (H)       No Known Allergies    Current Outpatient Medications   Medication Sig Dispense Refill     AmLODIPine Besylate (NORVASC PO) Take 5 mg by mouth       aspirin 81 MG tablet        atorvastatin (LIPITOR) 20 MG tablet Take 20 mg by mouth daily  3     blood glucose monitoring (ACCU-CHEK SMARTVIEW) test strip Test blood sugar BID. 4 Box 3     GLIPIZIDE PO Take by mouth 2 times daily       insulin pen needle (BD CAMPBELL U/F) 32G X 4 MM Use daily. 100 each 3     insulin pen needle (NOVOFINE) 32G X 6 MM Use daily or as directed. 100 each 3     liraglutide (VICTOZA PEN) 18 MG/3ML soln INJECT 1.8 MG SUBCUTANEOUSLY DAILY 36 mL 1     losartan-hydrochlorothiazide (HYZAAR) 100-25 MG per tablet Take 1 tablet by mouth daily 90 tablet 1     metFORMIN (GLUCOPHAGE) 1000 MG tablet Take 1 tablet twice a day - take with breakfast and supper. 60 tablet 11     Multiple Vitamins-Minerals (MULTIVITAMIN ADULT PO)        Oxycodone-Acetaminophen (PERCOCET PO) Take by mouth as needed       SIMVASTATIN PO Take 40 mg by mouth       syringe/needle, disp, 25G X 1\" 3 ML MISC Use every 2 weeks with testosterone or as directed. 50 each 1     Tadalafil (CIALIS PO) Take by mouth as needed for erectile dysfunction       testosterone (ANDROGEL 1.62% PUMP) 20.25 MG/ACT gel Place 4 pumps (81 mg) onto the skin daily 75 g 3     testosterone cypionate (DEPOTESTOTERONE) 200 MG/ML injection Inject 0.75 mLs (150 mg) into the muscle every 14 days 9 mL 3       Social History     Tobacco Use     Smoking status: Former Smoker     Last attempt to quit: 10/1/2015    "  Years since quitting: 3.2     Smokeless tobacco: Never Used   Substance Use Topics     Alcohol use: Not on file     Drug use: Not on file       Yumiko Cueva LPN  12/14/2018  7:33 AM

## 2018-12-14 NOTE — LETTER
"12/14/2018       RE: Diego Velarde  [address] [place] [address]     Dear Colleague,    Thank you for referring your patient, Diego Velarde, to the [facility] UROLOGY AND INST FOR PROSTATE AND UROLOGIC CANCERS at [facility]. Please see a copy of my visit note below.    Diego Velarde is a 73 year old male here for ED follow-up.  He is a past pt of Dr. Weathers.    History of DM.  He had hypergonadotropic hypogonadism, is now taking testosterone replacement therapy injection.    Has tried Cialis and Viagra- cause side effects like heartburn.  Don't work well for firmness.  Has tried HSIRIN.  He's interested in ICI at this time.    Component      Latest Ref Rng & Units 2/22/2018 8/17/2018 11/26/2018   Testosterone Total      240 - 950 ng/dL 135 (L) 295 424   Sex Hormone Binding Globulin      11 - 80 nmol/L 21  14   Free Testosterone Calculated      4.7 - 24.4 ng/dL 3.18 (L)  12.79   PSA      0 - 4 ug/L 2.09       Current Outpatient Medications   Medication     AmLODIPine Besylate (NORVASC PO)     aspirin 81 MG tablet     atorvastatin (LIPITOR) 20 MG tablet     blood glucose monitoring (ACCU-CHEK SMARTVIEW) test strip     GLIPIZIDE PO     insulin pen needle (BD CAMPBELL U/F) 32G X 4 MM     insulin pen needle (NOVOFINE) 32G X 6 MM     liraglutide (VICTOZA PEN) 18 MG/3ML soln     losartan-hydrochlorothiazide (HYZAAR) 100-25 MG per tablet     metFORMIN (GLUCOPHAGE) 1000 MG tablet     Multiple Vitamins-Minerals (MULTIVITAMIN ADULT PO)     Oxycodone-Acetaminophen (PERCOCET PO)     SIMVASTATIN PO     syringe/needle, disp, 25G X 1\" 3 ML MISC     Tadalafil (CIALIS PO)     testosterone (ANDROGEL 1.62% PUMP) 20.25 MG/ACT gel     testosterone cypionate (DEPOTESTOTERONE) 200 MG/ML injection     No current facility-administered medications for this visit.         ED/Vascular disease risk factors:  HTN: yes, treated  Hyperlipidemia: treated  Smoking: no   DM: yes  Cardiovascular disease: " "None known  Meds associated with ED that he's taking: BP medications, HCTZ  Anxiety/anger/depression:  No  Penile Plaques or curvature:  none.   Testosterone level is good on testosterone replacement therapy.      /82   Pulse 84   Ht 1.753 m (5' 9\")   Wt 100.5 kg (221 lb 9.6 oz)   BMI 32.72 kg/m       General: Alert, oriented, nad  Eyes: anicteric, EOMI.  Pulse: regular  Resps: normal, non-labored.  Abdomen:  nondistended.   exam: circ'ed. No plaques.  Testes ++, anodular, nontender.  Cord structures not remarkable.   VLADIMIR deferred     A-  Organic ED.  Hypogonadism      Plan  - continue testosterone replacement therapy.  - return to clinic for intracavernosal injections trial with 20mcg Edex.  - discussed 2nd line options of ED, intracavernosal injections, SHIRIN, IPP.    15min visit, over 50% face to face in counseling/discussion of above issues.         Niranjan Torres MD      "

## 2018-12-14 NOTE — PATIENT INSTRUCTIONS
Schedule nurse visit for Edex teaching on a morning that Dr. Torres is in clinic.      It was a pleasure meeting with you today.  Thank you for allowing me and my team the privilege of caring for you today.  YOU are the reason we are here, and I truly hope we provided you with the excellent service you deserve.  Please let us know if there is anything else we can do for you so that we can be sure you are leaving completely satisfied with your care experience.

## 2018-12-14 NOTE — PROGRESS NOTES
"Diego Velarde is a 73 year old male here for ED follow-up.  He is a past pt of Dr. Weathers.    History of DM.  He had hypergonadotropic hypogonadism, is now taking testosterone replacement therapy injection.    Has tried Cialis and Viagra- cause side effects like heartburn.  Don't work well for firmness.  Has tried SHIRIN.  He's interested in ICI at this time.    Component      Latest Ref Rng & Units 2/22/2018 8/17/2018 11/26/2018   Testosterone Total      240 - 950 ng/dL 135 (L) 295 424   Sex Hormone Binding Globulin      11 - 80 nmol/L 21  14   Free Testosterone Calculated      4.7 - 24.4 ng/dL 3.18 (L)  12.79   PSA      0 - 4 ug/L 2.09       Current Outpatient Medications   Medication     AmLODIPine Besylate (NORVASC PO)     aspirin 81 MG tablet     atorvastatin (LIPITOR) 20 MG tablet     blood glucose monitoring (ACCU-CHEK SMARTVIEW) test strip     GLIPIZIDE PO     insulin pen needle (BD CAMPBELL U/F) 32G X 4 MM     insulin pen needle (NOVOFINE) 32G X 6 MM     liraglutide (VICTOZA PEN) 18 MG/3ML soln     losartan-hydrochlorothiazide (HYZAAR) 100-25 MG per tablet     metFORMIN (GLUCOPHAGE) 1000 MG tablet     Multiple Vitamins-Minerals (MULTIVITAMIN ADULT PO)     Oxycodone-Acetaminophen (PERCOCET PO)     SIMVASTATIN PO     syringe/needle, disp, 25G X 1\" 3 ML MISC     Tadalafil (CIALIS PO)     testosterone (ANDROGEL 1.62% PUMP) 20.25 MG/ACT gel     testosterone cypionate (DEPOTESTOTERONE) 200 MG/ML injection     No current facility-administered medications for this visit.         ED/Vascular disease risk factors:  HTN: yes, treated  Hyperlipidemia: treated  Smoking: no   DM: yes  Cardiovascular disease: None known  Meds associated with ED that he's taking: BP medications, HCTZ  Anxiety/anger/depression:  No  Penile Plaques or curvature:  none.   Testosterone level is good on testosterone replacement therapy.      /82   Pulse 84   Ht 1.753 m (5' 9\")   Wt 100.5 kg (221 lb 9.6 oz)   BMI 32.72 kg/m      General: " Alert, oriented, nad  Eyes: anicteric, EOMI.  Pulse: regular  Resps: normal, non-labored.  Abdomen:  nondistended.   exam: circ'ed. No plaques.  Testes ++, anodular, nontender.  Cord structures not remarkable.   VLADIMIR deferred     A-  Organic ED.  Hypogonadism      Plan  - continue testosterone replacement therapy.  - return to clinic for intracavernosal injections trial with 20mcg Edex.  - discussed 2nd line options of ED, intracavernosal injections, SHIRIN, IPP.    15min visit, over 50% face to face in counseling/discussion of above issues.

## 2018-12-17 ENCOUNTER — TELEPHONE (OUTPATIENT)
Dept: UROLOGY | Facility: CLINIC | Age: 73
End: 2018-12-17

## 2018-12-17 DIAGNOSIS — E11.9 DIABETES MELLITUS, TYPE 2 (H): ICD-10-CM

## 2018-12-17 RX ORDER — LIRAGLUTIDE 6 MG/ML
INJECTION SUBCUTANEOUS
Qty: 6 ML | Refills: 1 | Status: SHIPPED | OUTPATIENT
Start: 2018-12-17 | End: 2019-05-05

## 2018-12-17 NOTE — TELEPHONE ENCOUNTER
----- Message from Meghann Cox CMA sent at 12/17/2018  7:27 AM CST -----  Please call patient and have them come for a nurse visit when  is here.    Thanks      Meghann

## 2018-12-21 DIAGNOSIS — E11.65 TYPE 2 DIABETES MELLITUS WITH HYPERGLYCEMIA, WITHOUT LONG-TERM CURRENT USE OF INSULIN (H): ICD-10-CM

## 2018-12-24 NOTE — TELEPHONE ENCOUNTER
GLIPIZIDE 10 MG TABLET  Last Written Prescription Date:  10/10/2017  Last Fill Quantity: 360,   # refills: 3  Last Office Visit : 12/5/2019  Future Office visit:  6/10/2019    Routing refill request to provider for review/approval because:  Drug not active on patient's medication list  Blood pressure less than 140/90 in past 6 months          BP Readings from Last 3 Encounters:   12/14/18 153/82   12/05/18 139/86   06/04/18 122/72

## 2018-12-27 ENCOUNTER — MYC MEDICAL ADVICE (OUTPATIENT)
Dept: ENDOCRINOLOGY | Facility: CLINIC | Age: 73
End: 2018-12-27

## 2018-12-27 RX ORDER — GLIPIZIDE 10 MG/1
TABLET ORAL
Qty: 360 TABLET | Refills: 2 | Status: SHIPPED | OUTPATIENT
Start: 2018-12-27 | End: 2019-07-29

## 2018-12-28 DIAGNOSIS — E29.1 MALE HYPOGONADISM: ICD-10-CM

## 2018-12-28 RX ORDER — TESTOSTERONE CYPIONATE 200 MG/ML
150 INJECTION, SOLUTION INTRAMUSCULAR
Qty: 9 ML | Refills: 1 | Status: SHIPPED | OUTPATIENT
Start: 2018-12-28 | End: 2019-06-10

## 2019-01-11 ENCOUNTER — MYC MEDICAL ADVICE (OUTPATIENT)
Dept: ENDOCRINOLOGY | Facility: CLINIC | Age: 74
End: 2019-01-11

## 2019-01-11 ENCOUNTER — ALLIED HEALTH/NURSE VISIT (OUTPATIENT)
Dept: UROLOGY | Facility: CLINIC | Age: 74
End: 2019-01-11
Payer: MEDICARE

## 2019-01-11 DIAGNOSIS — N52.01 ERECTILE DYSFUNCTION DUE TO ARTERIAL INSUFFICIENCY: Primary | ICD-10-CM

## 2019-01-11 DIAGNOSIS — N52.9 ED (ERECTILE DYSFUNCTION): Primary | ICD-10-CM

## 2019-01-15 ENCOUNTER — TELEPHONE (OUTPATIENT)
Dept: UROLOGY | Facility: CLINIC | Age: 74
End: 2019-01-15

## 2019-01-15 NOTE — TELEPHONE ENCOUNTER
Prior Authorization Retail Medication Request    Medication/Dose: Edex 20 MCG cartridge 2- pk kit  ICD code (if different than what is on RX):  Erectile dysfunction  Previously Tried and Failed:  Cialis  Rationale:  Cialis interferes with patient's BP medications.    Insurance Name:  BCBS platinum Blue  Insurance ID:  [id number]      Pharmacy Information (if different than what is on RX)  Name:  [facility] pharmacy  Phone:  [phone]

## 2019-01-16 NOTE — TELEPHONE ENCOUNTER
S; Patient insistent on trying to get off TRT    B ED, low T in past.     A ED, hypogonadism in elderly patient.     R  Reduce dose of testosterone to 100 mg (0.5 ml) for next 2 dose, then 50 mg (0.25 ml) for next 2 dose and then stop.     Repeat testosterone levels in 4-6 months and see what happens.     Best regards,   Sammy Alonso MD  [id number]  Endocrinology Service

## 2019-01-22 NOTE — TELEPHONE ENCOUNTER
Central Prior Authorization Team   Phone: [phone]      PA Initiation via phone    Medication: Edex 20 MCG cartridge 2- pk kit-PA Initiated  Insurance Company: [employer] Part D - Phone [phone] Fax [phone]  Pharmacy Filling the Rx: [id number] - [place], [place] - [address] [place] [address]  Filling Pharmacy Phone: [phone]  Filling Pharmacy Fax: [phone]  Start Date: 1/22/2019    Decision will be made within 72 hours.

## 2019-01-23 NOTE — TELEPHONE ENCOUNTER
PRIOR AUTHORIZATION DENIED    Medication: Edex 20 MCG cartridge 2- pk kit-DENIED    Denial Date: 1/22/2019    Denial Rational:             Appeal Information:

## 2019-01-24 DIAGNOSIS — N52.9 ED (ERECTILE DYSFUNCTION): Primary | ICD-10-CM

## 2019-02-06 DIAGNOSIS — I10 ESSENTIAL HYPERTENSION: ICD-10-CM

## 2019-02-07 RX ORDER — LOSARTAN POTASSIUM AND HYDROCHLOROTHIAZIDE 25; 100 MG/1; MG/1
1 TABLET ORAL DAILY
Qty: 90 TABLET | Refills: 1 | Status: SHIPPED | OUTPATIENT
Start: 2019-02-07 | End: 2019-08-04

## 2019-02-07 NOTE — TELEPHONE ENCOUNTER
LOSARTAN-HCTZ 100-25 MG TAB    Take 1 tablet by mouth daily    Last Written Prescription Date:  8/14/2018  Last Fill Quantity: 90,   # refills: 1  Last Office Visit : 12/5/2018  Future Office visit:  6/10/2019    Routing refill request to provider for review/approval because:  Blood pressure out of range   12/14/18 153/82   12/05/18 139/86   06/04/18 122/72

## 2019-03-20 ENCOUNTER — CARE COORDINATION (OUTPATIENT)
Dept: UROLOGY | Facility: CLINIC | Age: 74
End: 2019-03-20

## 2019-03-20 NOTE — PROGRESS NOTES
Patient calling to request changing his medication edex to trimix. He reports edex works fine but he is losing money with the amounts he not using at this time. Trimix is $70-90 per vial versus $100-$110 per vial.   He feels this is a better deal. Will update Dr Brian Gustafson, RN   Care Coordinator Urology

## 2019-05-02 DIAGNOSIS — E11.9 DIABETES MELLITUS, TYPE 2 (H): ICD-10-CM

## 2019-05-05 NOTE — TELEPHONE ENCOUNTER
liraglutide (VICTOZA PEN) 18 MG/3ML solution  Last Written Prescription Date:  12/17/18  Last Fill Quantity: 6ml,   # refills: 1  Last Office Visit : 12/5/18  Future Office visit:  6/10/19      Routing refill request to provider for review/approval because: bp > 140/90, labs past due

## 2019-05-06 RX ORDER — LIRAGLUTIDE 6 MG/ML
INJECTION SUBCUTANEOUS
Qty: 6 ML | Refills: 1 | Status: SHIPPED | OUTPATIENT
Start: 2019-05-06 | End: 2019-05-07

## 2019-05-07 DIAGNOSIS — E11.9 DIABETES MELLITUS, TYPE 2 (H): ICD-10-CM

## 2019-05-07 RX ORDER — LIRAGLUTIDE 6 MG/ML
INJECTION SUBCUTANEOUS
Qty: 27 ML | Refills: 3 | Status: SHIPPED | OUTPATIENT
Start: 2019-05-07 | End: 2019-08-28

## 2019-06-10 ENCOUNTER — OFFICE VISIT (OUTPATIENT)
Dept: ENDOCRINOLOGY | Facility: CLINIC | Age: 74
End: 2019-06-10
Payer: MEDICARE

## 2019-06-10 VITALS
DIASTOLIC BLOOD PRESSURE: 81 MMHG | BODY MASS INDEX: 32.29 KG/M2 | HEART RATE: 93 BPM | SYSTOLIC BLOOD PRESSURE: 144 MMHG | WEIGHT: 218 LBS | HEIGHT: 69 IN

## 2019-06-10 DIAGNOSIS — E11.49 TYPE 2 DIABETES MELLITUS WITH OTHER NEUROLOGIC COMPLICATION, WITHOUT LONG-TERM CURRENT USE OF INSULIN (H): Primary | ICD-10-CM

## 2019-06-10 DIAGNOSIS — E11.65 TYPE 2 DIABETES MELLITUS WITH HYPERGLYCEMIA, WITHOUT LONG-TERM CURRENT USE OF INSULIN (H): ICD-10-CM

## 2019-06-10 LAB — HBA1C MFR BLD: 7.9 % (ref 4.3–6)

## 2019-06-10 ASSESSMENT — PAIN SCALES - GENERAL: PAINLEVEL: NO PAIN (0)

## 2019-06-10 ASSESSMENT — MIFFLIN-ST. JEOR: SCORE: 1724.22

## 2019-06-10 NOTE — LETTER
6/10/2019       RE: Diego Velarde  [address] [place] [address]     Dear Colleague,    Thank you for referring your patient, Diego Velarde, to the [facility] ENDOCRINOLOGY at [facility]. Please see a copy of my visit note below.    Endocrinology progress note  06/10/2019      Interval history:    Happy with Edex injection, working well, off TRT.   Feet: worse with neuropathy. Numb most of the time  Active: hiking, 6.5 mile hike.   Pain in the legs, on oxycodone  No lows,   Continues to take Victoza even if it is expensive.   Diet poorly controlled due to several graduation parties he has been to.     Assessment and Plan:   1. 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   Pt's A1C is suboptimal at 7.9 % today.   He is to remain on Victoza 1.8 mg SQ daily, Glucotrol 10 mg 2 tabs po BID and Metformin 1000 mg BID.   He will decide about Jardiance after price check.   He will try to loose wt. Discussed diet and DM today.     2. NEUROPATHY: Bill reports increased numbness and tingling in both feet.   Gabapentin 300 mg 2 tabs each a.m. and 3 tabs at bedtime.   Alpha lipoic acid 600 mg daily    5. OVERWEIGHT:  Encouraged pt to make healthy food choices, reduce food portions with meals and remain active.   He has lost some weight.   Pt is to remain on Victoza dose 1.8 mg SQ daily.     6. ED  Off TRT  Urology referral: Alprostadil injections working well.     7. FOOT CARE:  He may benefit from being fitted with diabetic shoes/inserts.   I suggested that he be seen by Podiatry here.     8. Return to Endocrine Clinic to see me in 6 months with labs.    Sammy Alonso MD  [id number]  Endocrinology Service    =============================================    HPI   He has type 2 diabetes 5 + years ago, complicated by neuropathy and nephropathy, No hx of retinopathy.   For his diabetes, he is currently taking Glipizide 10 mg 2 tabs  BID, Metformin 1000 mg BID and Victoza 1.8 mg SQ daily.   He complains of numbness in his toes and feet. He reports increased tingling in both feet at this time. He resumed Gabapentin. He has chronic back pain and some heartburn.     Hypogonadism  Bill has low libido, poor erection. Considers having sex to be very important part of his family life.  His wife does not have complaints but he note that she is about 12 years younger than he is.  Has used vacuum device but did not have good response.  He had some benefit with Cialis - causes heartburn.  Viagra was helpful.  However he has remote history of migraine headaches with aura and with the use of Viagra, he had aura without headache. Generic sildenafil was not helpful.   His total testosterone levels have been low on several occasions.  Testosterone gel preparations were expensive and ineffective.  His ferritin levels were normal, rectal exam did not show any nodules in the prostate in the past.  LH was normal.  Most likely underlying etiology: Obesity and use of narcotics  He is v happy with current treatment.     Review of systems  denies n/v, SOB at rest, cough, chest pain, abd pain or diarrhea.   No dysuria, hematuria or foot ulcers.   Complete review of system was otherwise unremarkable    Diabetes Care   Retinopathy: none.   Nephropathy: Yes; pt's urine microalbuminuria was + in 2/2017. He is taking Lisinopril.   Neuropathy: yes.   Foot Exam:no ulcers; abnormal monofilamentous exam.   Taking aspirin:yes.   Lipids:LDL was 6 on 2/15/2017.Pt is taking Simvastatin.     Allergies   No Known Allergies     Family History   Father had type 2 diabetes.   He also has a brother with type 2 diabetes.    Social History   Smoke: none at this time; he quit smoking fall of 2015.   ETOH: rare.   .   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     Past Medical History   1. Type 2 diabetes mellitus dx 5 + years ago.   2. Neuropathy.   3. S/P bilateral knee  "replacement.   4. S/P left salivary cyst removed.   5. S/P hernia surgery.   6. Diverticulitis.   7. HTN.   8. Hyperlipidemia.     Physical Exam:   /81   Pulse 93   Ht 1.753 m (5' 9\")   Wt 98.9 kg (218 lb)   BMI 32.19 kg/m    GENERAL : In no apparent distress, obese male  HEENT: PERRLA; fundi not examined.  Thyroid no goiter  Neck no adenopathy  LUNGS: Clear b/l.   CARDIAC: RRR.   ABDOMEN: Nontender.   EXTREMITIES: No edema.   FEET: No ulcers; hammertoes; abnormal monofilamentous exam.  Diminished vibratory sensation.    Labs:  A1c 7.9 today.   Labs ordered.     ENDO DIABETES Latest Ref Rng & Units 2/22/2018 6/4/2018   HEMOGLOBIN A1C, POC 4.3 - 6.0 %  7.5 (A)   HGB 13.3 - 17.7 g/dL 13.8    CHOLESTEROL <200 mg/dL 105    LDL CHOLESTEROL, CALCULATED <100 mg/dL 2    HDL CHOLESTEROL >39 mg/dL 33 (L)    NON HDL CHOLESTEROL <130 mg/dL 72    TRIGLYCERIDES <150 mg/dL 351 (H)    ALBUMIN URINE MG/L mg/L 15    ALBUMIN URINE MG/G CR 0 - 17 mg/g Cr 51.56 (H)    CREATININE 0.66 - 1.25 mg/dL 0.90    POTASSIUM 3.4 - 5.3 mmol/L 4.2    ALT 0 - 70 U/L 32    AST 0 - 45 U/L 24    WBC 4.0 - 11.0 10e9/L 8.1    RBC 4.4 - 5.9 10e12/L 4.70    HGB 13.3 - 17.7 g/dL 13.8    HCT 40.0 - 53.0 % 41.5     - 450 10e9/L 233         Total testosterone normal after injections. Labs 11/26/18  Last A1c was in June  Results for OKSANA KRANTHI SOFI (MRN [id number]) as of 12/5/2018 08:09     Ref. Range 11/26/2018 10:00   Free Testosterone Calculated Latest Ref Range: 4.7 - 24.4 ng/dL 12.79   Testosterone Total Latest Ref Range: 240 - 950 ng/dL 424   Sex Hormone Binding Globulin Latest Ref Range: 11 - 80 nmol/L 14       Again, thank you for allowing me to participate in the care of your patient.      Sincerely,    Sammy Alonso MD      "

## 2019-06-10 NOTE — PATIENT INSTRUCTIONS
Continue current medications without any changes.   Please check prices for Jardiance with your pharmacy.

## 2019-06-10 NOTE — PROGRESS NOTES
Endocrinology progress note  06/10/2019      Interval history:    Happy with Edex injection, working well, off TRT.   Feet: worse with neuropathy. Numb most of the time  Active: hiking, 6.5 mile hike.   Pain in the legs, on oxycodone  No lows,   Continues to take Victoza even if it is expensive.   Diet poorly controlled due to several graduation parties he has been to.     Assessment and Plan:   1. 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   Pt's A1C is suboptimal at 7.9 % today.   He is to remain on Victoza 1.8 mg SQ daily, Glucotrol 10 mg 2 tabs po BID and Metformin 1000 mg BID.   He will decide about Jardiance after price check.   He will try to loose wt. Discussed diet and DM today.     2. NEUROPATHY: Justin reports increased numbness and tingling in both feet.   Gabapentin 300 mg 2 tabs each a.m. and 3 tabs at bedtime.   Alpha lipoic acid 600 mg daily    5. OVERWEIGHT:  Encouraged pt to make healthy food choices, reduce food portions with meals and remain active.   He has lost some weight.   Pt is to remain on Victoza dose 1.8 mg SQ daily.     6. ED  Off TRT  Urology referral: Alprostadil injections working well.     7. FOOT CARE:  He may benefit from being fitted with diabetic shoes/inserts.   I suggested that he be seen by Podiatry here.     8. Return to Endocrine Clinic to see me in 6 months with labs.    Sammy Alonso MD  [id number]  Endocrinology Service    =============================================    HPI   He has type 2 diabetes 5 + years ago, complicated by neuropathy and nephropathy, No hx of retinopathy.   For his diabetes, he is currently taking Glipizide 10 mg 2 tabs BID, Metformin 1000 mg BID and Victoza 1.8 mg SQ daily.   He complains of numbness in his toes and feet. He reports increased tingling in both feet at this time. He resumed Gabapentin. He has chronic back pain and some heartburn.     Hypogonadism  Justin has low libido, poor erection.  "Considers having sex to be very important part of his family life.  His wife does not have complaints but he note that she is about 12 years younger than he is.  Has used vacuum device but did not have good response.  He had some benefit with Cialis - causes heartburn.  Viagra was helpful.  However he has remote history of migraine headaches with aura and with the use of Viagra, he had aura without headache. Generic sildenafil was not helpful.   His total testosterone levels have been low on several occasions.  Testosterone gel preparations were expensive and ineffective.  His ferritin levels were normal, rectal exam did not show any nodules in the prostate in the past.  LH was normal.  Most likely underlying etiology: Obesity and use of narcotics  He is v happy with current treatment.     Review of systems  denies n/v, SOB at rest, cough, chest pain, abd pain or diarrhea.   No dysuria, hematuria or foot ulcers.   Complete review of system was otherwise unremarkable    Diabetes Care   Retinopathy: none.   Nephropathy: Yes; pt's urine microalbuminuria was + in 2/2017. He is taking Lisinopril.   Neuropathy: yes.   Foot Exam:no ulcers; abnormal monofilamentous exam.   Taking aspirin:yes.   Lipids:LDL was 6 on 2/15/2017.Pt is taking Simvastatin.     Allergies   No Known Allergies     Family History   Father had type 2 diabetes.   He also has a brother with type 2 diabetes.    Social History   Smoke: none at this time; he quit smoking fall of 2015.   ETOH: rare.   .   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     Past Medical History   1. Type 2 diabetes mellitus dx 5 + years ago.   2. Neuropathy.   3. S/P bilateral knee replacement.   4. S/P left salivary cyst removed.   5. S/P hernia surgery.   6. Diverticulitis.   7. HTN.   8. Hyperlipidemia.     Physical Exam:   /81   Pulse 93   Ht 1.753 m (5' 9\")   Wt 98.9 kg (218 lb)   BMI 32.19 kg/m   GENERAL : In no apparent distress, obese " male  HEENT: PERRLA; fundi not examined.  Thyroid no goiter  Neck no adenopathy  LUNGS: Clear b/l.   CARDIAC: RRR.   ABDOMEN: Nontender.   EXTREMITIES: No edema.   FEET: No ulcers; hammertoes; abnormal monofilamentous exam.  Diminished vibratory sensation.    Labs:  A1c 7.9 today.   Labs ordered.     ENDO DIABETES Latest Ref Rng & Units 2/22/2018 6/4/2018   HEMOGLOBIN A1C, POC 4.3 - 6.0 %  7.5 (A)   HGB 13.3 - 17.7 g/dL 13.8    CHOLESTEROL <200 mg/dL 105    LDL CHOLESTEROL, CALCULATED <100 mg/dL 2    HDL CHOLESTEROL >39 mg/dL 33 (L)    NON HDL CHOLESTEROL <130 mg/dL 72    TRIGLYCERIDES <150 mg/dL 351 (H)    ALBUMIN URINE MG/L mg/L 15    ALBUMIN URINE MG/G CR 0 - 17 mg/g Cr 51.56 (H)    CREATININE 0.66 - 1.25 mg/dL 0.90    POTASSIUM 3.4 - 5.3 mmol/L 4.2    ALT 0 - 70 U/L 32    AST 0 - 45 U/L 24    WBC 4.0 - 11.0 10e9/L 8.1    RBC 4.4 - 5.9 10e12/L 4.70    HGB 13.3 - 17.7 g/dL 13.8    HCT 40.0 - 53.0 % 41.5     - 450 10e9/L 233         Total testosterone normal after injections. Labs 11/26/18  Last A1c was in June  Results for KRANTHI GANDHI (MRN [id number]) as of 12/5/2018 08:09     Ref. Range 11/26/2018 10:00   Free Testosterone Calculated Latest Ref Range: 4.7 - 24.4 ng/dL 12.79   Testosterone Total Latest Ref Range: 240 - 950 ng/dL 424   Sex Hormone Binding Globulin Latest Ref Range: 11 - 80 nmol/L 14

## 2019-06-24 ENCOUNTER — RECORDS - HEALTHEAST (OUTPATIENT)
Dept: LAB | Facility: CLINIC | Age: 74
End: 2019-06-24

## 2019-06-24 LAB
ALBUMIN SERPL-MCNC: 4.3 G/DL (ref 3.5–5)
ALBUMIN SERPL-MCNC: 4.3 G/DL (ref 3.5–5)
ALP SERPL-CCNC: 76 U/L (ref 45–120)
ALP SERPL-CCNC: 76 U/L (ref 45–120)
ALT SERPL W P-5'-P-CCNC: 32 U/L (ref 0–45)
ALT SERPL-CCNC: 32 U/L (ref 0–45)
ANION GAP SERPL CALCULATED.3IONS-SCNC: 12 MMOL/L (ref 5–18)
ANION GAP SERPL CALCULATED.3IONS-SCNC: 12 MMOL/L (ref 5–18)
AST SERPL W P-5'-P-CCNC: 23 U/L (ref 0–40)
AST SERPL-CCNC: 23 U/L (ref 0–40)
BILIRUB SERPL-MCNC: 0.5 MG/DL (ref 0–1)
BILIRUB SERPL-MCNC: 0.5 MG/DL (ref 0–1)
BUN SERPL-MCNC: 15 MG/DL (ref 8–28)
BUN SERPL-MCNC: 15 MG/DL (ref 8–28)
CALCIUM SERPL-MCNC: 10.5 MG/DL (ref 8.5–10.5)
CALCIUM SERPL-MCNC: 105 MG/DL (ref 8.5–10.5)
CHLORIDE BLD-SCNC: 104 MMOL/L (ref 98–107)
CHLORIDE SERPLBLD-SCNC: 104 MMOL/L (ref 98–107)
CHOLEST SERPL-MCNC: 152 MG/DL
CHOLEST SERPL-MCNC: 152 MG/DL
CK SERPL-CCNC: 150 U/L (ref 30–190)
CK TOTAL: 150 U/L (ref 30–190)
CO2 SERPL-SCNC: 23 MMOL/L (ref 22–31)
CO2 SERPL-SCNC: 23 MMOL/L (ref 22–31)
CREAT SERPL-MCNC: 0.94 MG/DL (ref 0.7–1.3)
CREAT SERPL-MCNC: 0.94 MG/DL (ref 0.7–1.3)
CREAT UR-MCNC: 134.6 MG/DL
CREATININE URINE: 134.6
ERYTHROCYTE [DISTWIDTH] IN BLOOD BY AUTOMATED COUNT: 13.9 % (ref 11–14.5)
FASTING STATUS PATIENT QL REPORTED: ABNORMAL
GFR SERPL CREATININE-BSD FRML MDRD: >60 ML/MIN/1.73M2
GLUCOSE BLD-MCNC: 121 MG/DL (ref 70–125)
GLUCOSE SERPL-MCNC: 121 MG/DL (ref 70–125)
HCT VFR BLD AUTO: 42.7 % (ref 40–54)
HDLC SERPL-MCNC: 31 MG/DL
HDLC SERPL-MCNC: 31 MG/DL
HEMOGLOBIN: 14.4 G/DL (ref 14–18)
LDLC SERPL CALC-MCNC: 55 MG/DL
LDLC SERPL CALC-MCNC: 55 MG/DL
LDLC SERPL CALC-MCNC: ABNORMAL MG/DL
MCH RBC QN AUTO: 30.9 PG (ref 27–34)
MCHC RBC AUTO-ENTMCNC: 33.7 % (ref 32–36)
MCV RBC AUTO: 91.7 FL (ref 80–100)
MICROALBUMIN UR-MCNC: 11.44 MG/DL (ref 0–1.99)
MICROALBUMIN URINE: 11.44 MG/L (ref 0–1.99)
MICROALBUMIN/CR RATIO URINE: 85
MICROALBUMIN/CREAT UR: 85 MG/G
PLATELET # BLD AUTO: 259 10^9/L (ref 140–440)
POTASSIUM BLD-SCNC: 4.6 MMOL/L (ref 3.5–5)
POTASSIUM SERPL-SCNC: 4.6 MMOL/L (ref 3.5–5)
PROT SERPL-MCNC: 7.3 G/DL (ref 6–8)
PROT SERPL-MCNC: 7.3 G/DL (ref 6–8)
RBC # BLD AUTO: 4.66 10^12/L (ref 4.4–6.2)
SODIUM SERPL-SCNC: 139 MMOL/L (ref 136–145)
SODIUM SERPL-SCNC: 139 MMOL/L (ref 136–145)
TRIGL SERPL-MCNC: 424 MG/DL
TRIGL SERPL-MCNC: 424 MG/DL
TSH SERPL DL<=0.005 MIU/L-ACNC: 1.24 UIU/ML (ref 0.3–5)
TSH SERPL-ACNC: 1.24 MCU/ML (ref 0.3–5)
WBC # BLD AUTO: 7.8 10^9/L (ref 4–11)

## 2019-06-25 ENCOUNTER — DOCUMENTATION ONLY (OUTPATIENT)
Dept: ENDOCRINOLOGY | Facility: CLINIC | Age: 74
End: 2019-06-25

## 2019-07-01 NOTE — RESULT ENCOUNTER NOTE
Dear Justin,     Calcium levels appears to be an erroneous entry. I called the labs and confirmed that [facility] record indicate normal levels.     Cholesterol levels: Triglycerides levels are similar to the prior values, diet : try low fat diet, if the level are higher than 800, you may need additional medications.     Urine proteins have improved compared to prior results.     Thyroid labs are normal. Cell counts are normal.     Best regards,   Sammy Alonso MD  Endocrinology Service

## 2019-07-29 DIAGNOSIS — E11.65 TYPE 2 DIABETES MELLITUS WITH HYPERGLYCEMIA, WITHOUT LONG-TERM CURRENT USE OF INSULIN (H): ICD-10-CM

## 2019-07-31 RX ORDER — GLIPIZIDE 10 MG/1
TABLET ORAL
Qty: 360 TABLET | Refills: 3 | Status: SHIPPED | OUTPATIENT
Start: 2019-07-31

## 2019-08-04 DIAGNOSIS — I10 ESSENTIAL HYPERTENSION: ICD-10-CM

## 2019-08-06 RX ORDER — LOSARTAN POTASSIUM AND HYDROCHLOROTHIAZIDE 25; 100 MG/1; MG/1
1 TABLET ORAL DAILY
Qty: 90 TABLET | Refills: 2 | Status: SHIPPED | OUTPATIENT
Start: 2019-08-06

## 2019-08-06 NOTE — TELEPHONE ENCOUNTER
"Last clinic visit: 6/10/19  /81    Per Dr. Alonso: \"Continue current medications without any changes.\"    Upcoming appointment scheduled: 12/11/19   "

## 2019-08-23 ENCOUNTER — TELEPHONE (OUTPATIENT)
Dept: ENDOCRINOLOGY | Facility: CLINIC | Age: 74
End: 2019-08-23

## 2019-08-23 NOTE — TELEPHONE ENCOUNTER
Followed up with with ben [employer] and gave them the correct dosage amount and sig. It will take 7 days for this to be processed.

## 2019-08-23 NOTE — TELEPHONE ENCOUNTER
Health Call Center    Phone Message    May a detailed message be left on voicemail: yes    Reason for Call: Medication Question or concern regarding medication   Prescription Clarification  Name of Medication: liraglutide (VICTOZA PEN) 18 MG/3ML solution  Prescribing Provider: Dr. Sammy Alonso   Pharmacy: [id number] [address]   What on the order needs clarification? Pt states clinic had sent over a form to [employer] for pt's medication assistance program earlier this week. However, pt states the clinic sent the wrong dosage and he will be out of med soon. Pt requests a call be placed to the company at [phone], option 9. The fax is [phone].  Lastly, pt is concerned he will run out of med before the forms can be processed, and wanted to know if provider had any samples of this med available. Please advise.    Action Taken: Message routed to:  [facility] ([facility] Katty

## 2019-08-26 ENCOUNTER — TELEPHONE (OUTPATIENT)
Dept: ENDOCRINOLOGY | Facility: CLINIC | Age: 74
End: 2019-08-26

## 2019-08-28 ENCOUNTER — CARE COORDINATION (OUTPATIENT)
Dept: ENDOCRINOLOGY | Facility: CLINIC | Age: 74
End: 2019-08-28

## 2019-08-28 ENCOUNTER — TELEPHONE (OUTPATIENT)
Dept: ENDOCRINOLOGY | Facility: CLINIC | Age: 74
End: 2019-08-28

## 2019-08-28 DIAGNOSIS — E11.9 DIABETES MELLITUS, TYPE 2 (H): ICD-10-CM

## 2019-08-28 RX ORDER — LIRAGLUTIDE 6 MG/ML
INJECTION SUBCUTANEOUS
Qty: 27 ML | Refills: 0 | Status: SHIPPED | OUTPATIENT
Start: 2019-08-28 | End: 2020-05-19

## 2019-08-28 NOTE — TELEPHONE ENCOUNTER
"Left voicemail to inform patient that we are no longer able to give out free medication received by the pharm reps to give to patients who can't afford it. This is a new policy that has been started within this part week (August 28, 2019) and unfortunately we can't dispense medication to patients. We are working on this process right now and will follow up with her in the next week on possibilities to get insulin/medication.    *Asked for patient to try to expedite medication to get it sooner.     ================================================================================  \"Dr Alonso, Pt has informed us he will be without Victoza for 4-5 days while he is waiting for Rx to be dispensed from his Patient Assistance program. We have requested a pen for coverage from the pharmacy rep, but have not gotten a response yet. Pleas advise if pen is not available. Thank you. \" Brigida Power RN     "

## 2019-08-28 NOTE — PROGRESS NOTES
Received By Pharm Rep. Brought medication  Down to pharmacy at [facility].    1 pen    liraglutide (VICTOZA PEN) 18 MG/3ML solution 27 mL 0 8/28/2019  No   Sig: INJECT 1.8 MG SUBCUTANEOUSLY DAILY   Class: Local Print   Order: [id number]       Kenna Larsen RN on 8/28/2019 at 12:19 PM

## 2019-09-27 ENCOUNTER — HEALTH MAINTENANCE LETTER (OUTPATIENT)
Age: 74
End: 2019-09-27

## 2019-10-16 ENCOUNTER — MYC MEDICAL ADVICE (OUTPATIENT)
Dept: ENDOCRINOLOGY | Facility: CLINIC | Age: 74
End: 2019-10-16

## 2019-10-17 NOTE — TELEPHONE ENCOUNTER
S: Victoza is expensive.   Plan   Test A1c first before we make any changes, I have ordered this for you.  check with your insurance if following medication are less expensive.   1. Jardiance or Farxiga or Invokana  2. Ozempic or Trulicity or Bydureon.   3. Lantus or Basaglar or Novolog    If any of these are covered, I will change medications.   If not, then plan is to discontinue Victoza and start insulin. Going higher on other medications may not be of much value.     Sammy Alonso MD  Endocrinology Service

## 2019-10-22 ENCOUNTER — TELEPHONE (OUTPATIENT)
Dept: ENDOCRINOLOGY | Facility: CLINIC | Age: 74
End: 2019-10-22

## 2019-10-22 NOTE — TELEPHONE ENCOUNTER
----- Message from FABI Lazar sent at 10/16/2019  3:20 PM CDT -----  Regarding: RE: victoza   when you return  PAP is patient assistance program.  So the one he's on for Voctoza is thru [employer] and that's the only program available. Next year, he has to meet the $1000 OOP requirement to be eligible to apply. (unless they change that).  The only other option is if he's eligible for the Part D extra help program. The financial criteria for that are :  https://www.medicarerights.org/fliers/Help-With-Drug-Costs/Extra-Help-Chart.pdf?nrd=1  Lolita  ----- Message -----  From: Brittany Bah RN  Sent: 10/16/2019  12:04 PM CDT  To: FABI Lazar  Subject: victoza   when you return                        Diego is on medicare  receiving victoza through a PAP program? He is good until the end of the year  but then will need coverage . Do you know of any programs he will qualify for into next year? It may be too soon  and will have to wait until after the  first of the year. Brittany RICHARDSON

## 2019-10-22 NOTE — TELEPHONE ENCOUNTER
I asked Diego not to take his victoza any differently  . After the first of the year  We can check into  the  Part D extra help program . F/.u is  12/11/19 with Dr Alonso and a change in medications can be discussed  then  for now  he has medication  until the end of the year. Brittany Bah RN on 10/22/2019 at 4:02 PM

## 2019-11-15 ENCOUNTER — TELEPHONE (OUTPATIENT)
Dept: ENDOCRINOLOGY | Facility: CLINIC | Age: 74
End: 2019-11-15

## 2019-11-15 DIAGNOSIS — I10 ESSENTIAL HYPERTENSION: Primary | ICD-10-CM

## 2019-11-15 RX ORDER — HYDROCHLOROTHIAZIDE 25 MG/1
25 TABLET ORAL DAILY
Qty: 90 TABLET | Refills: 3 | Status: SHIPPED | OUTPATIENT
Start: 2019-11-15 | End: 2021-02-03

## 2019-11-15 RX ORDER — LOSARTAN POTASSIUM 100 MG/1
100 TABLET ORAL DAILY
Qty: 90 TABLET | Refills: 3 | Status: SHIPPED | OUTPATIENT
Start: 2019-11-15 | End: 2021-02-11

## 2019-11-15 NOTE — TELEPHONE ENCOUNTER
losartan-hydrochlorothiazide (HYZAAR) 100-25 MG tablet   Ordered as separate Rx  Losartan 100mg  Hydrochlorothiazide 25mg    Not new order, just separate dispense order per Pharmacy request.   Brigida Power RN on 11/15/2019 at 8:56 AM

## 2019-11-15 NOTE — TELEPHONE ENCOUNTER
Message from [facility] pharmacy  Please send new separate RXs for Losartan and Hydrochlorothiazide. Unable to get combination product.

## 2019-11-15 NOTE — TELEPHONE ENCOUNTER
Message from [facility] pharmacy  Please send new separate RXs for Losartan and Hydrochlorothiazide. Unable to get combination product.

## 2019-12-03 ENCOUNTER — OFFICE VISIT (OUTPATIENT)
Dept: ENDOCRINOLOGY | Facility: CLINIC | Age: 74
End: 2019-12-03
Payer: MEDICARE

## 2019-12-03 VITALS
WEIGHT: 220.8 LBS | BODY MASS INDEX: 32.7 KG/M2 | HEIGHT: 69 IN | SYSTOLIC BLOOD PRESSURE: 123 MMHG | DIASTOLIC BLOOD PRESSURE: 80 MMHG | HEART RATE: 88 BPM

## 2019-12-03 DIAGNOSIS — E11.65 TYPE 2 DIABETES MELLITUS WITH HYPERGLYCEMIA, WITHOUT LONG-TERM CURRENT USE OF INSULIN (H): Primary | ICD-10-CM

## 2019-12-03 LAB — HBA1C MFR BLD: 7.4 % (ref 4.3–6)

## 2019-12-03 ASSESSMENT — ENCOUNTER SYMPTOMS
HEMATURIA: 0
NECK PAIN: 0
DYSURIA: 0
BACK PAIN: 1
SKIN CHANGES: 0
POOR WOUND HEALING: 0
JOINT SWELLING: 0
NAIL CHANGES: 0
FLANK PAIN: 0
DIFFICULTY URINATING: 0
MYALGIAS: 0

## 2019-12-03 ASSESSMENT — PAIN SCALES - GENERAL: PAINLEVEL: NO PAIN (0)

## 2019-12-03 ASSESSMENT — MIFFLIN-ST. JEOR: SCORE: 1736.92

## 2019-12-03 NOTE — LETTER
12/3/2019     RE: Diego Velarde  [address] [place] [address]     Dear Colleague,    Thank you for referring your patient, Diego Velarde, to the [facility] ENDOCRINOLOGY at [facility]. Please see a copy of my visit note below.    [facility]  Endocrinology  Olga Corea MD  12/03/2019      Chief Complaint:   Diabetes    History of Present Illness:   Diego Velarde is a 74 year old male with a history of type 2 diabetes and hypertension who presents for follow up of diabetes. The patient was diagnosed with type 2 diabetes more than 5 years ago. He has been treated with Victoza 1.8 mg subcutaneous daily, Glucotrol 10 mg 2 tablets PO BID, and Metforming 1000 mg BID. He was on Gabapentin 300 mg 2 tablets AM and 3 tablets PM for neuropathy. He is unclear why this was stopped.  He has been told to reduce food portions and remain active in hopes of losing weight.     The patient had blood drawn at his primary care provider in June, the results of which are below. He is urinating frequently at night, about 2-3 times, and is wondering if this is due to his medications. He has had increased bilateral foot pain on the soles of his feet, and he is no longer taking gabapentin. He takes oxycodone chronically due to back pain.  He has some intermittent rash on his bilateral hands and is wondering if this is related to his diabetes. He exercises 2-3 times a week. He denies any chest pain or history of heart disease.    Blood Glucose Monitoring:  We reviewed glucometer data together.  Average: 159  SD: 22.1  High: 185  Low: 130  # of tests: 8    Diabetes monitoring and complications:  CAD: No  Microalbuminuria: 6/24/19 - 85.0 (High)  Neuropathy: Yes  HTN: Yes  On Statin: Yes  On Aspirin: Yes  Depression: No  Erectile dysfunction: Yes    Patient Supplied Answers To Diabetic Questionnaire  including 5/27/2019   1. Since your last visit to our clinic (or if this your first visit,  since you last saw your primary care provider), have you experienced any of the following symptoms that may be related to low blood sugars? No, I have not experienced any of these symptoms   2. Since your last visit to our clinic (or if this your first visit, since you last saw your primary care provider), have you experienced any of the following symptoms that may be related to prolonged high blood sugars? Increased urination   3. Have you had any concerns that you would like to discuss today? -   4. Do you have any female family members who have had heart attacks or strokes before age 60 or male relatives who have had heart attacks or strokes before age 50? No   5. Do you have any family members who have had complications from diabetes such as kidney disease, heart disease or strokes, retinopathy (a vision problem), or amputations? No   6. Who do you live with?  Spouse   Little interest or pleasure in doing things? Not at all   Feeling down, depressed, or hopeless? Not at all        Review of Systems:   Pertinent items are noted in HPI.  All other systems are negative.    Active Medications:     Current Outpatient Medications:      AmLODIPine Besylate (NORVASC PO), Take 5 mg by mouth, Disp: , Rfl:      aspirin 81 MG tablet, , Disp: , Rfl:      atorvastatin (LIPITOR) 20 MG tablet, Take 20 mg by mouth daily, Disp: , Rfl: 3     blood glucose monitoring (ACCU-CHEK SMARTVIEW) test strip, Test blood sugar BID., Disp: 4 Box, Rfl: 3     COMPOUNDED NON-CONTROLLED SUBSTANCE (CMPD RX) - PHARMACY TO MIX COMPOUNDED MEDICATION, Alprostadil 50 mcg inject 0.4 mL intracavitary  May Inject once daily, no more than three times per week, Disp: 5 mL, Rfl: 11     glipiZIDE (GLUCOTROL) 10 MG tablet, TAKE 2 TABS BY MOUTH TWICE A DAY., Disp: 360 tablet, Rfl: 3     insulin pen needle (BD CAMPBELL U/F) 32G X 4 MM, Use daily., Disp: 100 each, Rfl: 3     liraglutide (VICTOZA PEN) 18 MG/3ML solution, INJECT 1.8 MG SUBCUTANEOUSLY DAILY, Disp: 27  "mL, Rfl: 0     losartan-hydrochlorothiazide (HYZAAR) 100-25 MG tablet, Take 1 tablet by mouth daily, Disp: 90 tablet, Rfl: 2     metFORMIN (GLUCOPHAGE) 1000 MG tablet, Take 1 tablet twice a day - take with breakfast and supper., Disp: 60 tablet, Rfl: 11     Multiple Vitamins-Minerals (MULTIVITAMIN ADULT PO), , Disp: , Rfl:      Needle, Disp, (BD ECLIPSE NEEDLE) 30G X 1/2\" MISC, Use as directed, Disp: 12 each, Rfl: 11     Oxycodone-Acetaminophen (PERCOCET PO), Take by mouth as needed, Disp: , Rfl:      syringe/needle, disp, 25G X 1\" 3 ML MISC, Use every 2 weeks with testosterone or as directed., Disp: 50 each, Rfl: 1     alprostadil (EDEX) 20 MCG kit, 20 mcg by Intracavitary route as needed for erectile dysfunction use no more than 3 times per week, Disp: 40 mcg, Rfl: 3     empagliflozin (JARDIANCE) 10 MG TABS tablet, Take 1 tablet (10 mg) by mouth daily (Patient not taking: Reported on 12/3/2019), Disp: 30 tablet, Rfl: 1     hydrochlorothiazide (HYDRODIURIL) 25 MG tablet, Take 1 tablet (25 mg) by mouth daily (Patient not taking: Reported on 12/3/2019), Disp: 90 tablet, Rfl: 3     insulin pen needle (NOVOFINE) 32G X 6 MM miscellaneous, Use 1 daily or as directed., Disp: 100 each, Rfl: 3     losartan (COZAAR) 100 MG tablet, Take 1 tablet (100 mg) by mouth daily (Patient not taking: Reported on 12/3/2019), Disp: 90 tablet, Rfl: 3     Tadalafil (CIALIS PO), Take by mouth as needed for erectile dysfunction, Disp: , Rfl:       Allergies:   Patient has no known allergies.      Past Medical History:  Type 2 diabetes mellitus  Diverticulitis  Erectile dysfunction  Abdominal hernia  Hypertension  Hyperlipidemia  Hypogonadism     Past Surgical History:  Sublingual salivary cyst excision  Total knee arthroplasty: Bilateral  Hernia repair    Social History:   Social History     Tobacco Use     Smoking status: Former Smoker     Last attempt to quit: 10/1/2015     Years since quittin.1     Smokeless tobacco: Never Used " "  Substance Use Topics     Alcohol use: None     Drug use: None        Physical Exam:   /80   Pulse 88   Ht 1.753 m (5' 9\")   Wt 100.2 kg (220 lb 12.8 oz)   BMI 32.61 kg/m        Wt Readings from Last 10 Encounters:   12/03/19 100.2 kg (220 lb 12.8 oz)   06/10/19 98.9 kg (218 lb)   12/14/18 100.5 kg (221 lb 9.6 oz)   12/05/18 101.8 kg (224 lb 6.4 oz)   06/04/18 99.5 kg (219 lb 4.8 oz)   01/10/18 99.7 kg (219 lb 11.2 oz)   12/06/17 99.9 kg (220 lb 3.2 oz)   10/10/17 99.8 kg (220 lb)   07/19/17 98.4 kg (216 lb 14.4 oz)   04/19/17 101 kg (222 lb 11.2 oz)      Constitutional: no distress, comfortable, pleasant   Eyes: anicteric, normal extra-ocular movements, No lig lag, retraction or proptosis  Neck: supple, no thyromegaly.   Cardiovascular: regular rate and rhythm, normal S1 and S2, no murmurs  Respiratory: clear to auscultation, no wheezes or crackles, normal breath sounds   Gastrointestinal: nontender, no striae   Musculoskeletal: no edema   Skin:  no jaundice   Neurological:no tremor  Psychological: appropriate mood   Lymphatic: no cervical lymphadenopathy    Data:  Lab Results   Component Value Date     06/24/2019    POTASSIUM 4.6 06/24/2019    CHLORIDE 104 06/24/2019    CO2 23 06/24/2019    ANIONGAP 12 06/24/2019     06/24/2019    BUN 15 06/24/2019    CR 0.94 06/24/2019    ELZBIETA 105 (A) 06/24/2019     Lab Results   Component Value Date    GFRESTIMATED 83 02/22/2018    GFRESTIMATED 87 05/09/2017    GFRESTIMATED 90 08/11/2016    GFRESTBLACK >90 02/22/2018    GFRESTBLACK >90   GFR Calc   05/09/2017    GFRESTBLACK >90   GFR Calc   08/11/2016      Lab Results   Component Value Date    MICROL 15 02/22/2018    UMALCR 51.56 (H) 02/22/2018        Lab Results   Component Value Date    HEMOGLOBINA1 7.9 (A) 06/10/2019    HEMOGLOBINA1 7.5 (A) 06/04/2018    HEMOGLOBINA1 7.3 (A) 01/10/2018    HEMOGLOBINA1 6.6 (A) 10/10/2017    HEMOGLOBINA1 7.1 (A) 07/19/2017     No results " found for: CPEPT, GADAB, ISCAB    Lab Results   Component Value Date    CHOL 152 06/24/2019    CHOL 105 02/22/2018    TRIG 424 (A) 06/24/2019    TRIG 351 (H) 02/22/2018    HDL 31 (A) 06/24/2019    HDL 33 (L) 02/22/2018    LDL 55 06/24/2019    LDL 2 02/22/2018    NHDL 72 02/22/2018    NHDL 77 02/15/2017     Assessment and Plan:  Diabetes mellitus, type 2 (H)  Over all good control on current regimen but patient is concerned about the cost of Victoza. He is currently covered but would like to readdress this next year. He may consider insulin as an alternative but is concerned about weight gain with insulin.  Feels that percocet that he takes for back pain also help with neuropathy pain in the  feet.   - Hemoglobin A1c POCT     Follow-up: Return in about 6 months (around 6/3/2020).     DWAIN Sommers    I spent 25 minutes with this patient face to face and explained the conditions and plans (more than 50% of time was counseling/coordination of care, diabetes management) . The patient understood and is satisfied with today's visit.     Scribe Disclosure:  I, Georgie Islas, am serving as a scribe to document services personally performed by Olga Corea MD at this visit, based upon the provider's statements to me. All documentation has been reviewed by the aforementioned provider prior to being entered into the official medical record.    Portions of this medical record were completed by a scribe. UPON MY REVIEW AND AUTHENTICATION BY ELECTRONIC SIGNATURE, this confirms (a) I performed the applicable clinical services, and (b) the record is accurate.      Sincerely,    Olga Corea MD

## 2019-12-03 NOTE — NURSING NOTE
Chief Complaint   Patient presents with     RECHECK     Type 2 Diabetes     Capillary puncture performed for Hemoglobin A1C test. Patient tolerated well.    Sadia Meyer MA

## 2019-12-03 NOTE — PROGRESS NOTES
[facility]  Endocrinology  Olga Corea MD  12/03/2019      Chief Complaint:   Diabetes    History of Present Illness:   Diego Velarde is a 74 year old male with a history of type 2 diabetes and hypertension who presents for follow up of diabetes. The patient was diagnosed with type 2 diabetes more than 5 years ago. He has been treated with Victoza 1.8 mg subcutaneous daily, Glucotrol 10 mg 2 tablets PO BID, and Metforming 1000 mg BID. He was on Gabapentin 300 mg 2 tablets AM and 3 tablets PM for neuropathy. He is unclear why this was stopped.  He has been told to reduce food portions and remain active in hopes of losing weight.     The patient had blood drawn at his primary care provider in June, the results of which are below. He is urinating frequently at night, about 2-3 times, and is wondering if this is due to his medications. He has had increased bilateral foot pain on the soles of his feet, and he is no longer taking gabapentin. He takes oxycodone chronically due to back pain.  He has some intermittent rash on his bilateral hands and is wondering if this is related to his diabetes. He exercises 2-3 times a week. He denies any chest pain or history of heart disease.    Blood Glucose Monitoring:  We reviewed glucometer data together.  Average: 159  SD: 22.1  High: 185  Low: 130  # of tests: 8    Diabetes monitoring and complications:  CAD: No  Microalbuminuria: 6/24/19 - 85.0 (High)  Neuropathy: Yes  HTN: Yes  On Statin: Yes  On Aspirin: Yes  Depression: No  Erectile dysfunction: Yes    Patient Supplied Answers To Diabetic Questionnaire  including 5/27/2019   1. Since your last visit to our clinic (or if this your first visit, since you last saw your primary care provider), have you experienced any of the following symptoms that may be related to low blood sugars? No, I have not experienced any of these symptoms   2. Since your last visit to our clinic (or if this your first visit, since you last saw  your primary care provider), have you experienced any of the following symptoms that may be related to prolonged high blood sugars? Increased urination   3. Have you had any concerns that you would like to discuss today? -   4. Do you have any female family members who have had heart attacks or strokes before age 60 or male relatives who have had heart attacks or strokes before age 50? No   5. Do you have any family members who have had complications from diabetes such as kidney disease, heart disease or strokes, retinopathy (a vision problem), or amputations? No   6. Who do you live with?  Spouse   Little interest or pleasure in doing things? Not at all   Feeling down, depressed, or hopeless? Not at all        Review of Systems:   Pertinent items are noted in HPI.  All other systems are negative.    Active Medications:     Current Outpatient Medications:      AmLODIPine Besylate (NORVASC PO), Take 5 mg by mouth, Disp: , Rfl:      aspirin 81 MG tablet, , Disp: , Rfl:      atorvastatin (LIPITOR) 20 MG tablet, Take 20 mg by mouth daily, Disp: , Rfl: 3     blood glucose monitoring (ACCU-CHEK SMARTVIEW) test strip, Test blood sugar BID., Disp: 4 Box, Rfl: 3     COMPOUNDED NON-CONTROLLED SUBSTANCE (CMPD RX) - PHARMACY TO MIX COMPOUNDED MEDICATION, Alprostadil 50 mcg inject 0.4 mL intracavitary  May Inject once daily, no more than three times per week, Disp: 5 mL, Rfl: 11     glipiZIDE (GLUCOTROL) 10 MG tablet, TAKE 2 TABS BY MOUTH TWICE A DAY., Disp: 360 tablet, Rfl: 3     insulin pen needle (BD CAMPBELL U/F) 32G X 4 MM, Use daily., Disp: 100 each, Rfl: 3     liraglutide (VICTOZA PEN) 18 MG/3ML solution, INJECT 1.8 MG SUBCUTANEOUSLY DAILY, Disp: 27 mL, Rfl: 0     losartan-hydrochlorothiazide (HYZAAR) 100-25 MG tablet, Take 1 tablet by mouth daily, Disp: 90 tablet, Rfl: 2     metFORMIN (GLUCOPHAGE) 1000 MG tablet, Take 1 tablet twice a day - take with breakfast and supper., Disp: 60 tablet, Rfl: 11     Multiple  "Vitamins-Minerals (MULTIVITAMIN ADULT PO), , Disp: , Rfl:      Needle, Disp, (BD ECLIPSE NEEDLE) 30G X 1/2\" MISC, Use as directed, Disp: 12 each, Rfl: 11     Oxycodone-Acetaminophen (PERCOCET PO), Take by mouth as needed, Disp: , Rfl:      syringe/needle, disp, 25G X 1\" 3 ML MISC, Use every 2 weeks with testosterone or as directed., Disp: 50 each, Rfl: 1     alprostadil (EDEX) 20 MCG kit, 20 mcg by Intracavitary route as needed for erectile dysfunction use no more than 3 times per week, Disp: 40 mcg, Rfl: 3     empagliflozin (JARDIANCE) 10 MG TABS tablet, Take 1 tablet (10 mg) by mouth daily (Patient not taking: Reported on 12/3/2019), Disp: 30 tablet, Rfl: 1     hydrochlorothiazide (HYDRODIURIL) 25 MG tablet, Take 1 tablet (25 mg) by mouth daily (Patient not taking: Reported on 12/3/2019), Disp: 90 tablet, Rfl: 3     insulin pen needle (NOVOFINE) 32G X 6 MM miscellaneous, Use 1 daily or as directed., Disp: 100 each, Rfl: 3     losartan (COZAAR) 100 MG tablet, Take 1 tablet (100 mg) by mouth daily (Patient not taking: Reported on 12/3/2019), Disp: 90 tablet, Rfl: 3     Tadalafil (CIALIS PO), Take by mouth as needed for erectile dysfunction, Disp: , Rfl:       Allergies:   Patient has no known allergies.      Past Medical History:  Type 2 diabetes mellitus  Diverticulitis  Erectile dysfunction  Abdominal hernia  Hypertension  Hyperlipidemia  Hypogonadism     Past Surgical History:  Sublingual salivary cyst excision  Total knee arthroplasty: Bilateral  Hernia repair    Social History:   Social History     Tobacco Use     Smoking status: Former Smoker     Last attempt to quit: 10/1/2015     Years since quittin.1     Smokeless tobacco: Never Used   Substance Use Topics     Alcohol use: None     Drug use: None        Physical Exam:   /80   Pulse 88   Ht 1.753 m (5' 9\")   Wt 100.2 kg (220 lb 12.8 oz)   BMI 32.61 kg/m       Wt Readings from Last 10 Encounters:   19 100.2 kg (220 lb 12.8 oz)   06/10/19 " 98.9 kg (218 lb)   12/14/18 100.5 kg (221 lb 9.6 oz)   12/05/18 101.8 kg (224 lb 6.4 oz)   06/04/18 99.5 kg (219 lb 4.8 oz)   01/10/18 99.7 kg (219 lb 11.2 oz)   12/06/17 99.9 kg (220 lb 3.2 oz)   10/10/17 99.8 kg (220 lb)   07/19/17 98.4 kg (216 lb 14.4 oz)   04/19/17 101 kg (222 lb 11.2 oz)      Constitutional: no distress, comfortable, pleasant   Eyes: anicteric, normal extra-ocular movements, No lig lag, retraction or proptosis  Neck: supple, no thyromegaly.   Cardiovascular: regular rate and rhythm, normal S1 and S2, no murmurs  Respiratory: clear to auscultation, no wheezes or crackles, normal breath sounds   Gastrointestinal: nontender, no striae   Musculoskeletal: no edema   Skin:  no jaundice   Neurological:no tremor  Psychological: appropriate mood   Lymphatic: no cervical lymphadenopathy    Data:  Lab Results   Component Value Date     06/24/2019    POTASSIUM 4.6 06/24/2019    CHLORIDE 104 06/24/2019    CO2 23 06/24/2019    ANIONGAP 12 06/24/2019     06/24/2019    BUN 15 06/24/2019    CR 0.94 06/24/2019    ELZBIETA 105 (A) 06/24/2019     Lab Results   Component Value Date    GFRESTIMATED 83 02/22/2018    GFRESTIMATED 87 05/09/2017    GFRESTIMATED 90 08/11/2016    GFRESTBLACK >90 02/22/2018    GFRESTBLACK >90   GFR Calc   05/09/2017    GFRESTBLACK >90   GFR Calc   08/11/2016      Lab Results   Component Value Date    MICROL 15 02/22/2018    UMALCR 51.56 (H) 02/22/2018        Lab Results   Component Value Date    HEMOGLOBINA1 7.9 (A) 06/10/2019    HEMOGLOBINA1 7.5 (A) 06/04/2018    HEMOGLOBINA1 7.3 (A) 01/10/2018    HEMOGLOBINA1 6.6 (A) 10/10/2017    HEMOGLOBINA1 7.1 (A) 07/19/2017     No results found for: CPEPT, GADAB, ISCAB    Lab Results   Component Value Date    CHOL 152 06/24/2019    CHOL 105 02/22/2018    TRIG 424 (A) 06/24/2019    TRIG 351 (H) 02/22/2018    HDL 31 (A) 06/24/2019    HDL 33 (L) 02/22/2018    LDL 55 06/24/2019    LDL 2 02/22/2018    NHDL 72  02/22/2018    [facility] 77 02/15/2017     Assessment and Plan:  Diabetes mellitus, type 2 (H)  Over all good control on current regimen but patient is concerned about the cost of Victoza. He is currently covered but would like to readdress this next year. He may consider insulin as an alternative but is concerned about weight gain with insulin.  Feels that percocet that he takes for back pain also help with neuropathy pain in the  feet.   - Hemoglobin A1c POCT     Follow-up: Return in about 6 months (around 6/3/2020).     DWAIN Sommers    I spent 25 minutes with this patient face to face and explained the conditions and plans (more than 50% of time was counseling/coordination of care, diabetes management) . The patient understood and is satisfied with today's visit.     Scribe Disclosure:  I, Georgie Islas, am serving as a scribe to document services personally performed by Olga Corea MD at this visit, based upon the provider's statements to me. All documentation has been reviewed by the aforementioned provider prior to being entered into the official medical record.    Portions of this medical record were completed by a scribe. UPON MY REVIEW AND AUTHENTICATION BY ELECTRONIC SIGNATURE, this confirms (a) I performed the applicable clinical services, and (b) the record is accurate.

## 2020-03-15 ENCOUNTER — HEALTH MAINTENANCE LETTER (OUTPATIENT)
Age: 75
End: 2020-03-15

## 2020-04-03 ENCOUNTER — RECORDS - HEALTHEAST (OUTPATIENT)
Dept: LAB | Facility: CLINIC | Age: 75
End: 2020-04-03

## 2020-04-03 LAB
ANION GAP SERPL CALCULATED.3IONS-SCNC: 10 MMOL/L (ref 5–18)
BUN SERPL-MCNC: 18 MG/DL (ref 8–28)
CALCIUM SERPL-MCNC: 9.4 MG/DL (ref 8.5–10.5)
CHLORIDE BLD-SCNC: 102 MMOL/L (ref 98–107)
CO2 SERPL-SCNC: 26 MMOL/L (ref 22–31)
CREAT SERPL-MCNC: 1 MG/DL (ref 0.7–1.3)
GFR SERPL CREATININE-BSD FRML MDRD: >60 ML/MIN/1.73M2
GLUCOSE BLD-MCNC: 164 MG/DL (ref 70–125)
POTASSIUM BLD-SCNC: 4.4 MMOL/L (ref 3.5–5)
SODIUM SERPL-SCNC: 138 MMOL/L (ref 136–145)

## 2020-05-19 ENCOUNTER — MYC REFILL (OUTPATIENT)
Dept: ENDOCRINOLOGY | Facility: CLINIC | Age: 75
End: 2020-05-19

## 2020-05-19 DIAGNOSIS — E11.9 DIABETES MELLITUS, TYPE 2 (H): ICD-10-CM

## 2020-05-19 RX ORDER — LIRAGLUTIDE 6 MG/ML
INJECTION SUBCUTANEOUS
Qty: 27 ML | Refills: 0 | Status: SHIPPED | OUTPATIENT
Start: 2020-05-19 | End: 2020-07-17

## 2020-05-19 RX ORDER — LIRAGLUTIDE 6 MG/ML
INJECTION SUBCUTANEOUS
Qty: 27 ML | Refills: 0 | Status: CANCELLED | OUTPATIENT
Start: 2020-05-19

## 2020-06-04 ENCOUNTER — DOCUMENTATION ONLY (OUTPATIENT)
Dept: CARE COORDINATION | Facility: CLINIC | Age: 75
End: 2020-06-04

## 2020-06-08 ENCOUNTER — TELEPHONE (OUTPATIENT)
Dept: ENDOCRINOLOGY | Facility: CLINIC | Age: 75
End: 2020-06-08

## 2020-06-08 NOTE — TELEPHONE ENCOUNTER
[facility] Call Center    Phone Message    May a detailed message be left on voicemail: no     Reason for Call: pt cancelled his appt tomorrow with Anmol, unable to do video appt and refused telephone appt. Pt wants in clinic appt. Please call to discuss.     Action Taken: Message routed to:  [facility] ([facility] endo    Travel Screening: Not Applicable

## 2020-06-09 NOTE — TELEPHONE ENCOUNTER
Update: We are not currently planning on in-person visits until September, and this, again, may change. Please inform patient. Pt can be scheduled as virtual until September.

## 2020-06-09 NOTE — TELEPHONE ENCOUNTER
CLINIC COORDINATOR SCHEDULING NOTES    CALL RESULT: LVM    APPT TYPE: RDI    PROVIDER: Elle Corea    DATE APPT NEEDED: 7/7 or after    ADDITIONAL NOTES: We are not expecting in-person appointments for our clinic to be happening until 7/7, and this could still change. We are uncertain at this time what visitor restrictions will still be in place. Please let pt know.

## 2020-06-12 ENCOUNTER — RECORDS - HEALTHEAST (OUTPATIENT)
Dept: LAB | Facility: CLINIC | Age: 75
End: 2020-06-12

## 2020-06-12 LAB
ANION GAP SERPL CALCULATED.3IONS-SCNC: 13 MMOL/L (ref 5–18)
BUN SERPL-MCNC: 21 MG/DL (ref 8–28)
CALCIUM SERPL-MCNC: 9.8 MG/DL (ref 8.5–10.5)
CHLORIDE BLD-SCNC: 104 MMOL/L (ref 98–107)
CHOLEST SERPL-MCNC: 145 MG/DL
CO2 SERPL-SCNC: 20 MMOL/L (ref 22–31)
CREAT SERPL-MCNC: 1.04 MG/DL (ref 0.7–1.3)
FASTING STATUS PATIENT QL REPORTED: ABNORMAL
GFR SERPL CREATININE-BSD FRML MDRD: >60 ML/MIN/1.73M2
GLUCOSE BLD-MCNC: 166 MG/DL (ref 70–125)
HDLC SERPL-MCNC: 31 MG/DL
LDLC SERPL CALC-MCNC: 50 MG/DL
POTASSIUM BLD-SCNC: 4.5 MMOL/L (ref 3.5–5)
SODIUM SERPL-SCNC: 137 MMOL/L (ref 136–145)
TRIGL SERPL-MCNC: 319 MG/DL

## 2020-07-10 ENCOUNTER — TELEPHONE (OUTPATIENT)
Dept: UROLOGY | Facility: CLINIC | Age: 75
End: 2020-07-10

## 2020-07-10 ENCOUNTER — PRE VISIT (OUTPATIENT)
Dept: UROLOGY | Facility: CLINIC | Age: 75
End: 2020-07-10

## 2020-07-10 NOTE — TELEPHONE ENCOUNTER
Chief Complaint   Patient presents with     Refill Request     Prost/SODIU 50       [facility] pharmacy was notified that the patient needs to follow up with Dr. Niranjan Torres to renew his PROST/SODIU 50 (Edex) prescription. Pharmacist agreed with the plan.      Bjorn Waddell MA

## 2020-07-10 NOTE — TELEPHONE ENCOUNTER
Hi all,      Please call patient to schedule a virtual or in clinic appointment with Dr. Niranjan Torres to renew his Edex medication.    Thanks, Bjorn Waddell MA

## 2020-07-10 NOTE — TELEPHONE ENCOUNTER
Reason for Visit: Annual med refill    Diagnosis: Erectile Dysfunction    Orders/Procedures/Records: in system    Contact Patient: n/a    Rooming Requirements: normal      Yumiko Cueva LPN  07/10/20  10:16 AM

## 2020-07-13 ENCOUNTER — TRANSFERRED RECORDS (OUTPATIENT)
Dept: HEALTH INFORMATION MANAGEMENT | Facility: CLINIC | Age: 75
End: 2020-07-13

## 2020-07-13 ENCOUNTER — RECORDS - HEALTHEAST (OUTPATIENT)
Dept: LAB | Facility: CLINIC | Age: 75
End: 2020-07-13

## 2020-07-13 LAB — PSA SERPL-MCNC: 2 NG/ML (ref 0–6.5)

## 2020-07-17 ENCOUNTER — OFFICE VISIT (OUTPATIENT)
Dept: UROLOGY | Facility: CLINIC | Age: 75
End: 2020-07-17
Payer: MEDICARE

## 2020-07-17 VITALS — BODY MASS INDEX: 30.21 KG/M2 | HEIGHT: 69 IN | WEIGHT: 204 LBS

## 2020-07-17 DIAGNOSIS — R35.1 NOCTURIA: Primary | ICD-10-CM

## 2020-07-17 DIAGNOSIS — N52.9 ERECTILE DYSFUNCTION, UNSPECIFIED ERECTILE DYSFUNCTION TYPE: ICD-10-CM

## 2020-07-17 LAB
ALBUMIN UR-MCNC: NEGATIVE MG/DL
APPEARANCE UR: ABNORMAL
BILIRUB UR QL STRIP: NEGATIVE
COLOR UR AUTO: ABNORMAL
GLUCOSE UR STRIP-MCNC: NEGATIVE MG/DL
HGB UR QL STRIP: NEGATIVE
HYALINE CASTS #/AREA URNS LPF: 15 /LPF (ref 0–2)
KETONES UR STRIP-MCNC: NEGATIVE MG/DL
LEUKOCYTE ESTERASE UR QL STRIP: NEGATIVE
MUCOUS THREADS #/AREA URNS LPF: PRESENT /LPF
NITRATE UR QL: NEGATIVE
PH UR STRIP: 5 PH (ref 5–7)
RBC #/AREA URNS AUTO: 0 /HPF (ref 0–2)
SOURCE: ABNORMAL
SP GR UR STRIP: 1.02 (ref 1–1.03)
SQUAMOUS #/AREA URNS AUTO: <1 /HPF (ref 0–1)
UROBILINOGEN UR STRIP-MCNC: 0 MG/DL (ref 0–2)
WBC #/AREA URNS AUTO: 1 /HPF (ref 0–5)

## 2020-07-17 ASSESSMENT — MIFFLIN-ST. JEOR: SCORE: 1655.72

## 2020-07-17 ASSESSMENT — PAIN SCALES - GENERAL: PAINLEVEL: NO PAIN (0)

## 2020-07-17 NOTE — LETTER
"7/17/2020       RE: Diego Velarde  [address] [place] [address]     Dear Colleague,    Thank you for referring your patient, Diego Velarde, to the [facility] UROLOGY AND INST FOR PROSTATE AND UROLOGIC CANCERS at [facility]. Please see a copy of my visit note below.      Diego Velarde is a 75 year old male here for alprostadil refill.  Has been using 20 mcg.  He would like to increase the dose some, I advised him on how to do this.  He had a recent PSA that is normal.    Lab Results   Component Value Date    PSA 2.09 02/22/2018        PSA 7/13/20 was 2.0 at outside facility.    Other  ROS:  He was on testosterone replacement therapy and has stopped these injections. (did not help ED).    Lower Urinary Tract Symptoms, at present:    Frequency: varies, often not    Urgency: varies  Often not    Nocturia 1-3x/night.      Weakness of stream: good    Intermittency: no    Straining: no    Emptying: no    Hematuria or dysuria: none       Ht 1.753 m (5' 9\")   Wt 92.5 kg (204 lb)   BMI 30.13 kg/m      General: Alert, oriented, nad  Eyes: anicteric, EOMI.  Pulse: regular  Resps: normal, non-labored.  Abdomen:  nondistended.   exam: 50g, prostate, protuberant prostate, anod    A-  Lower urinary tract symptoms  ED- organic.  He is managing modifiable risk factors.  Nocturia.      Plan  - VLADIMIR today anodular.  - refilled alprostadill injection.  Discussed he can increase the dose slightly if needed.  - he declines preventative cardiologist referral given ED that is likely due to arterial disease..  - PVR: 13mL  - UA today.  - advised limiting caffeine and fluid intake after 7PM to decrease nocturia.      Monse SHARPE     15min visit, over 50% face to face in counseling/discussion of above issues.                 "

## 2020-07-17 NOTE — RESULT ENCOUNTER NOTE
Dear Diego     Here are your recent test results which are NORMAL.  There are no concerns.  No evidence of blood or infection.   Casts are not felt to be significant- often these can be due to recent dehydration or poor fluid intake.      Thank You,    Please let me know if you have any questions!    Monse SHARPE

## 2020-07-17 NOTE — PROGRESS NOTES
"  Diego Velarde is a 75 year old male here for alprostadil refill.  Has been using 20 mcg.  He would like to increase the dose some, I advised him on how to do this.  He had a recent PSA that is normal.    Lab Results   Component Value Date    PSA 2.09 02/22/2018        PSA 7/13/20 was 2.0 at outside facility.    Other  ROS:  He was on testosterone replacement therapy and has stopped these injections. (did not help ED).    Lower Urinary Tract Symptoms, at present:    Frequency: varies, often not    Urgency: varies  Often not    Nocturia 1-3x/night.      Weakness of stream: good    Intermittency: no    Straining: no    Emptying: no    Hematuria or dysuria: none       Ht 1.753 m (5' 9\")   Wt 92.5 kg (204 lb)   BMI 30.13 kg/m      General: Alert, oriented, nad  Eyes: anicteric, EOMI.  Pulse: regular  Resps: normal, non-labored.  Abdomen:  nondistended.   exam: 50g, prostate, protuberant prostate, anod    A-  Lower urinary tract symptoms  ED- organic.  He is managing modifiable risk factors.  Nocturia.      Plan  - VLADIMIR today anodular.  - refilled alprostadill injection.  Discussed he can increase the dose slightly if needed.  - he declines preventative cardiologist referral given ED that is likely due to arterial disease..  - PVR: 13mL  - UA today.  - advised limiting caffeine and fluid intake after 7PM to decrease nocturia.      Monse SHARPE     15min visit, over 50% face to face in counseling/discussion of above issues.             "

## 2020-07-24 DIAGNOSIS — N52.9 ERECTILE DYSFUNCTION, UNSPECIFIED ERECTILE DYSFUNCTION TYPE: ICD-10-CM

## 2020-11-12 ENCOUNTER — RECORDS - HEALTHEAST (OUTPATIENT)
Dept: LAB | Facility: CLINIC | Age: 75
End: 2020-11-12

## 2020-11-12 LAB
ANION GAP SERPL CALCULATED.3IONS-SCNC: 11 MMOL/L (ref 5–18)
BUN SERPL-MCNC: 20 MG/DL (ref 8–28)
CALCIUM SERPL-MCNC: 9.3 MG/DL (ref 8.5–10.5)
CHLORIDE BLD-SCNC: 104 MMOL/L (ref 98–107)
CO2 SERPL-SCNC: 23 MMOL/L (ref 22–31)
CREAT SERPL-MCNC: 0.98 MG/DL (ref 0.7–1.3)
GFR SERPL CREATININE-BSD FRML MDRD: >60 ML/MIN/1.73M2
GLUCOSE BLD-MCNC: 159 MG/DL (ref 70–125)
POTASSIUM BLD-SCNC: 4.9 MMOL/L (ref 3.5–5)
SODIUM SERPL-SCNC: 138 MMOL/L (ref 136–145)

## 2020-12-30 NOTE — TELEPHONE ENCOUNTER
----- Message from Sammy Alonso MD sent at 8/26/2019 11:49 AM CDT -----  Regarding: RE: FYI coverage for Victoza   Monitor sugars fasting and premeals and call with any issues.   Sammy Alonso MD  Endocrinology Service    ----- Message -----  From: Brigida Power RN  Sent: 8/26/2019  10:05 AM  To: Sammy Alonso MD  Subject: FYI coverage for Victoza                         Dr Alonso Pt has informed us he will be without Victoza for 4-5 days while he is waiting for Rx to be dispensed from his Patient Assistance program. We have requested a pen for coverage from the pharmacy rep, but have not gotten a response yet. Pleas advise if pen is not available. Thank you.     
Spoke w/ Pt: he is aware of pur request for pen from [employer] rep and the timeline restrictions. He confirms understanding of recommendations from Dr Alonso. He confirms understanding of when to contact clinic.   Brigida Power RN on 8/26/2019 at 1:01 PM     
Strong peripheral pulses

## 2021-01-09 ENCOUNTER — HEALTH MAINTENANCE LETTER (OUTPATIENT)
Age: 76
End: 2021-01-09

## 2021-01-10 ENCOUNTER — RECORDS - HEALTHEAST (OUTPATIENT)
Dept: LAB | Facility: CLINIC | Age: 76
End: 2021-01-10

## 2021-01-10 LAB
SARS-COV-2 PCR COMMENT: NORMAL
SARS-COV-2 RNA SPEC QL NAA+PROBE: NEGATIVE
SARS-COV-2 VIRUS SPECIMEN SOURCE: NORMAL

## 2021-01-11 ENCOUNTER — RECORDS - HEALTHEAST (OUTPATIENT)
Dept: LAB | Facility: CLINIC | Age: 76
End: 2021-01-11

## 2021-01-11 LAB
ANION GAP SERPL CALCULATED.3IONS-SCNC: 11 MMOL/L (ref 5–18)
BUN SERPL-MCNC: 16 MG/DL (ref 8–28)
CALCIUM SERPL-MCNC: 10.4 MG/DL (ref 8.5–10.5)
CHLORIDE BLD-SCNC: 101 MMOL/L (ref 98–107)
CO2 SERPL-SCNC: 25 MMOL/L (ref 22–31)
CREAT SERPL-MCNC: 0.98 MG/DL (ref 0.7–1.3)
GFR SERPL CREATININE-BSD FRML MDRD: >60 ML/MIN/1.73M2
GLUCOSE BLD-MCNC: 266 MG/DL (ref 70–125)
POTASSIUM BLD-SCNC: 4.7 MMOL/L (ref 3.5–5)
SODIUM SERPL-SCNC: 137 MMOL/L (ref 136–145)

## 2021-01-30 ENCOUNTER — RECORDS - HEALTHEAST (OUTPATIENT)
Dept: LAB | Facility: CLINIC | Age: 76
End: 2021-01-30

## 2021-02-02 DIAGNOSIS — I10 ESSENTIAL HYPERTENSION: ICD-10-CM

## 2021-02-03 RX ORDER — HYDROCHLOROTHIAZIDE 25 MG/1
25 TABLET ORAL DAILY
Qty: 90 TABLET | Refills: 0 | Status: SHIPPED | OUTPATIENT
Start: 2021-02-03 | End: 2021-05-27

## 2021-02-11 DIAGNOSIS — I10 ESSENTIAL HYPERTENSION: ICD-10-CM

## 2021-02-11 RX ORDER — LOSARTAN POTASSIUM 100 MG/1
100 TABLET ORAL DAILY
Qty: 90 TABLET | Refills: 0 | Status: SHIPPED | OUTPATIENT
Start: 2021-02-11 | End: 2021-05-06

## 2021-02-11 NOTE — TELEPHONE ENCOUNTER
losartan (COZAAR) 100 MG tablet  Last Written Prescription Date:  11/15/2019  Last Fill Quantity: 90,   # refills: 3  Last Office Visit : 6/9/2020  Future Office visit:  None    Routing refill request to provider for review/approval because:  Gap in refills?   Nov 2019?      Refer to Provider for review       Meghann Soto RN  Central Triage Red Flags/Med Refills

## 2021-05-04 DIAGNOSIS — I10 ESSENTIAL HYPERTENSION: Primary | ICD-10-CM

## 2021-05-06 RX ORDER — LOSARTAN POTASSIUM 100 MG/1
100 TABLET ORAL DAILY
Qty: 90 TABLET | Refills: 0 | Status: SHIPPED | OUTPATIENT
Start: 2021-05-06 | End: 2021-07-30

## 2021-05-08 ENCOUNTER — HEALTH MAINTENANCE LETTER (OUTPATIENT)
Age: 76
End: 2021-05-08

## 2021-05-25 ENCOUNTER — RECORDS - HEALTHEAST (OUTPATIENT)
Dept: ADMINISTRATIVE | Facility: CLINIC | Age: 76
End: 2021-05-25

## 2021-05-25 DIAGNOSIS — I10 ESSENTIAL HYPERTENSION: ICD-10-CM

## 2021-05-27 ENCOUNTER — TELEPHONE (OUTPATIENT)
Dept: ENDOCRINOLOGY | Facility: CLINIC | Age: 76
End: 2021-05-27

## 2021-05-27 NOTE — TELEPHONE ENCOUNTER
HYDROCHLOROTHIAZIDE 25 MG TAB  Last Written Prescription Date:  2/3/2021  Last Fill Quantity: 90,   # refills: 0  Last Office Visit : 6/9/2020  Future Office visit:  None    Routing refill request to provider for review/approval because:  Second Request     Would Provider like updated B/P and labs on file for this medication?  Please advise     BP Readings from Last 3 Encounters:   12/03/19 123/80   06/10/19 144/81   12/14/18 153/82        Recent Labs   Lab Test 06/24/19   CR 0.94            Normal serum potassium on file in past 12 months        Recent Labs   Lab Test 06/24/19   POTASSIUM 4.6                     Normal serum sodium on file in past 12 months        Recent Labs   Lab Test 06/24/19                 Meghann Soto RN  Central Triage Red Flags/Med Refills

## 2021-05-27 NOTE — TELEPHONE ENCOUNTER
Left detailed message with scheduling request from Dr Corea or to follow up with PCP. Clinic number provided.   Brigida Power RN on 5/27/2021 at 10:15 AM

## 2021-05-27 NOTE — TELEPHONE ENCOUNTER
----- Message from Olga Corea MD sent at 5/27/2021 10:06 AM CDT -----  Patient last seen in December 2019.  I have received a refill request.  Does he plan to follow-up in our clinic?  He needs to schedule a visit for further refills.  Refill request should go to PCP if no further follow-up planned at our clinic.  Thanks

## 2021-05-28 RX ORDER — HYDROCHLOROTHIAZIDE 25 MG/1
25 TABLET ORAL DAILY
Qty: 30 TABLET | Refills: 0 | Status: SHIPPED | OUTPATIENT
Start: 2021-05-28

## 2021-05-29 ENCOUNTER — RECORDS - HEALTHEAST (OUTPATIENT)
Dept: ADMINISTRATIVE | Facility: CLINIC | Age: 76
End: 2021-05-29

## 2021-05-31 ENCOUNTER — RECORDS - HEALTHEAST (OUTPATIENT)
Dept: ADMINISTRATIVE | Facility: CLINIC | Age: 76
End: 2021-05-31

## 2021-06-02 ENCOUNTER — RECORDS - HEALTHEAST (OUTPATIENT)
Dept: ADMINISTRATIVE | Facility: CLINIC | Age: 76
End: 2021-06-02

## 2021-07-16 ENCOUNTER — LAB REQUISITION (OUTPATIENT)
Dept: LAB | Facility: CLINIC | Age: 76
End: 2021-07-16
Payer: MEDICARE

## 2021-07-16 DIAGNOSIS — I10 ESSENTIAL (PRIMARY) HYPERTENSION: ICD-10-CM

## 2021-07-16 DIAGNOSIS — E78.00 PURE HYPERCHOLESTEROLEMIA, UNSPECIFIED: ICD-10-CM

## 2021-07-16 LAB
ALBUMIN SERPL-MCNC: 4.2 G/DL (ref 3.5–5)
ALP SERPL-CCNC: 110 U/L (ref 45–120)
ALT SERPL W P-5'-P-CCNC: 30 U/L (ref 0–45)
ANION GAP SERPL CALCULATED.3IONS-SCNC: 15 MMOL/L (ref 5–18)
AST SERPL W P-5'-P-CCNC: 23 U/L (ref 0–40)
BILIRUB SERPL-MCNC: 0.5 MG/DL (ref 0–1)
BUN SERPL-MCNC: 14 MG/DL (ref 8–28)
CALCIUM SERPL-MCNC: 9.4 MG/DL (ref 8.5–10.5)
CHLORIDE BLD-SCNC: 103 MMOL/L (ref 98–107)
CHOLEST SERPL-MCNC: 147 MG/DL
CO2 SERPL-SCNC: 21 MMOL/L (ref 22–31)
CREAT SERPL-MCNC: 0.88 MG/DL (ref 0.7–1.3)
GFR SERPL CREATININE-BSD FRML MDRD: 84 ML/MIN/1.73M2
GLUCOSE BLD-MCNC: 171 MG/DL (ref 70–125)
HDLC SERPL-MCNC: 29 MG/DL
LDLC SERPL CALC-MCNC: ABNORMAL MG/DL
POTASSIUM BLD-SCNC: 4.5 MMOL/L (ref 3.5–5)
PROT SERPL-MCNC: 7 G/DL (ref 6–8)
SODIUM SERPL-SCNC: 139 MMOL/L (ref 136–145)
TRIGL SERPL-MCNC: 653 MG/DL

## 2021-07-16 PROCEDURE — 80061 LIPID PANEL: CPT | Mod: ORL | Performed by: PHYSICIAN ASSISTANT

## 2021-07-16 PROCEDURE — 83721 ASSAY OF BLOOD LIPOPROTEIN: CPT | Mod: ORL | Performed by: PHYSICIAN ASSISTANT

## 2021-07-16 PROCEDURE — 80053 COMPREHEN METABOLIC PANEL: CPT | Mod: ORL | Performed by: PHYSICIAN ASSISTANT

## 2021-07-17 LAB — LDLC SERPL CALC-MCNC: 43 MG/DL

## 2021-07-29 DIAGNOSIS — I10 ESSENTIAL HYPERTENSION: ICD-10-CM

## 2021-07-30 RX ORDER — LOSARTAN POTASSIUM 100 MG/1
100 TABLET ORAL DAILY
Qty: 30 TABLET | Refills: 0 | Status: SHIPPED | OUTPATIENT
Start: 2021-07-30

## 2021-07-30 NOTE — TELEPHONE ENCOUNTER
Last Clinic Visit: 12/3/19, recommended 6 month follow up, no upcoming appointments scheduled.  > 18 months since last visit, 30 day refill provided, routed to clinic scheduling for follow up  Overdue for recorded blood pressure

## 2021-08-02 ENCOUNTER — TELEPHONE (OUTPATIENT)
Dept: ENDOCRINOLOGY | Facility: CLINIC | Age: 76
End: 2021-08-02

## 2021-08-02 NOTE — TELEPHONE ENCOUNTER
LVM for return visit with vinita gu or howie. Pervious scheduling attempts made, PT is aware of need to schedule.

## 2021-08-03 NOTE — TELEPHONE ENCOUNTER
Pt returned call to the clinic, pt stated he now gets his diabetic care through his PCP and no longer needs to be seen by Dr. Corea or the endo clinic

## 2021-08-26 DIAGNOSIS — I10 ESSENTIAL HYPERTENSION: ICD-10-CM

## 2021-08-28 ENCOUNTER — HEALTH MAINTENANCE LETTER (OUTPATIENT)
Age: 76
End: 2021-08-28

## 2021-08-31 RX ORDER — LOSARTAN POTASSIUM AND HYDROCHLOROTHIAZIDE 25; 100 MG/1; MG/1
TABLET ORAL
Qty: 90 TABLET | Refills: 2
Start: 2021-08-31

## 2021-08-31 RX ORDER — LOSARTAN POTASSIUM 100 MG/1
TABLET ORAL
Qty: 30 TABLET | Refills: 0 | OUTPATIENT
Start: 2021-08-31

## 2021-08-31 NOTE — TELEPHONE ENCOUNTER
Per telephone encounter from 8/2/21, no longer receiving diabetic care with Dr Corea, is following with primary care provider.  Refill declined

## 2021-10-23 ENCOUNTER — HEALTH MAINTENANCE LETTER (OUTPATIENT)
Age: 76
End: 2021-10-23

## 2021-12-07 ENCOUNTER — TELEPHONE (OUTPATIENT)
Dept: UROLOGY | Facility: CLINIC | Age: 76
End: 2021-12-07
Payer: MEDICARE

## 2021-12-07 DIAGNOSIS — N52.9 ERECTILE DYSFUNCTION, UNSPECIFIED ERECTILE DYSFUNCTION TYPE: ICD-10-CM

## 2021-12-07 NOTE — TELEPHONE ENCOUNTER
[facility] Call Center    Phone Message    May a detailed message be left on voicemail: yes     Reason for Call: Medication Refill Request    Has the patient contacted the pharmacy for the refill? Yes   Name of medication being requested: Alprostadil 50 mcg/mL  Provider who prescribed the medication: Dr. Torres  Pharmacy: [facility]  Date medication is needed: 12/7/21         Action Taken: Message routed to:  [facility] Med refill    Travel Screening: Not Applicable                                                                       24-Jun-2021 03:36

## 2021-12-07 NOTE — TELEPHONE ENCOUNTER
alprostadil (EDEX) 20 MCG kit       Last Written Prescription Date: 1/11/19  Last Fill Quantity:40 mcg   # refills: 3  Last Office Visit :7/17/20  Future Office visit: 2/3/22      Routing refill request to provider for review/approval because: med end date 7/17/20. request per pt call.

## 2022-01-11 ENCOUNTER — PRE VISIT (OUTPATIENT)
Dept: UROLOGY | Facility: CLINIC | Age: 77
End: 2022-01-11
Payer: MEDICARE

## 2022-01-11 NOTE — TELEPHONE ENCOUNTER
Reason for visit: Follow up     Relevant information: medication refill    Records/imaging/labs/orders: in EPIC    Pt called: no    At Rooming: normal

## 2022-02-03 ENCOUNTER — VIRTUAL VISIT (OUTPATIENT)
Dept: UROLOGY | Facility: CLINIC | Age: 77
End: 2022-02-03
Payer: MEDICARE

## 2022-02-03 VITALS — WEIGHT: 209 LBS | HEIGHT: 69 IN | BODY MASS INDEX: 30.96 KG/M2

## 2022-02-03 DIAGNOSIS — N52.9 ERECTILE DYSFUNCTION, UNSPECIFIED ERECTILE DYSFUNCTION TYPE: Primary | ICD-10-CM

## 2022-02-03 PROCEDURE — 99441 PR PHYSICIAN TELEPHONE EVALUATION 5-10 MIN: CPT | Mod: 95 | Performed by: UROLOGY

## 2022-02-03 RX ORDER — GABAPENTIN 300 MG/1
CAPSULE ORAL
COMMUNITY

## 2022-02-03 RX ORDER — DULAGLUTIDE 1.5 MG/.5ML
INJECTION, SOLUTION SUBCUTANEOUS
COMMUNITY

## 2022-02-03 RX ORDER — OXYCODONE HYDROCHLORIDE 5 MG/1
TABLET ORAL
COMMUNITY
Start: 2021-10-20

## 2022-02-03 ASSESSMENT — PAIN SCALES - GENERAL: PAINLEVEL: NO PAIN (0)

## 2022-02-03 ASSESSMENT — MIFFLIN-ST. JEOR: SCORE: 1673.4

## 2022-02-03 NOTE — PROGRESS NOTES
Diego is a 76 year old who is being evaluated via a billable telephone visit.      What phone number would you like to be contacted at? [phone]  How would you like to obtain your AVS? Johnathon  Phone call duration: 5 minutes    Pt here to follow-up ED.  Refilled injection, generic alprostadil 0.4mL of 50mcg/mL  3 back surgery in the last years, so he's not been as sexually active.  He recently tried alprostadil that had been in the refrigerator x 6 weeks, and this was only 80% effective.    Lab Results   Component Value Date    PSA 2.0 07/13/2020    PSA 2.09 02/22/2018        Discussed routine PSA screening is not required at this age.    A  ED  BPH- nocturia x 1.     Plan  - return to clinic 1 year PRN.  - discussed may titrate up on alprostadil to get better efficacy.  Advised keeping thawed alprostadil in the fridge < 30d for best potency of the medication.    5 min call, ending 9:55 AM

## 2022-02-03 NOTE — NURSING NOTE
"Chief Complaint   Patient presents with     Follow Up     annual medication refill       Height 1.753 m (5' 9\"), weight 94.8 kg (209 lb). Body mass index is 30.86 kg/m .    Patient Active Problem List   Diagnosis     ED (erectile dysfunction)     Sciatic pain     Diabetes mellitus, type 2 (H)       No Known Allergies    Current Outpatient Medications   Medication Sig Dispense Refill     AmLODIPine Besylate (NORVASC PO) Take 5 mg by mouth       aspirin 81 MG tablet        atorvastatin (LIPITOR) 20 MG tablet Take 20 mg by mouth daily  3     blood glucose monitoring (ACCU-CHEK SMARTVIEW) test strip Test blood sugar BID. 4 Box 3     COMPOUNDED NON-CONTROLLED SUBSTANCE (CMPD RX) - PHARMACY TO MIX COMPOUNDED MEDICATION Alprostadil 50 mcg/mL. Inject 0.4 mL intracavitary May Inject once daily, no more than three times per week.  May increase the dose as directed. 5 mL 11     dulaglutide (TRULICITY) 1.5 MG/0.5ML pen INJECT 1.5 MG BY MOUTH ONCE A WEEK SUBCUTANEOUSLY       empagliflozin (JARDIANCE) 10 MG TABS tablet Take 1 tablet (10 mg) by mouth daily 30 tablet 1     gabapentin (NEURONTIN) 300 MG capsule 1 capsule       glipiZIDE (GLUCOTROL) 10 MG tablet TAKE 2 TABS BY MOUTH TWICE A DAY. 360 tablet 3     hydrochlorothiazide (HYDRODIURIL) 25 MG tablet Take 1 tablet (25 mg) by mouth daily Please schedule clinic appt for refills. 30 tablet 0     insulin pen needle (BD CAMPBELL U/F) 32G X 4 MM Use daily. 100 each 3     losartan (COZAAR) 100 MG tablet Take 1 tablet (100 mg) by mouth daily For additional refills, please schedule an appointment at [phone] 30 tablet 0     losartan-hydrochlorothiazide (HYZAAR) 100-25 MG tablet Take 1 tablet by mouth daily 90 tablet 2     metFORMIN (GLUCOPHAGE) 1000 MG tablet Take 1 tablet twice a day - take with breakfast and supper. 60 tablet 11     Multiple Vitamin (MULTI VITAMIN) TABS 1 tab(s)       Multiple Vitamins-Minerals (MULTIVITAMIN ADULT PO)        Needle, Disp, (BD ECLIPSE NEEDLE) 30G X " "1/2\" MISC Use as directed 12 each 11     nicotine (NICORETTE) 2 MG gum 2 mg by Other route       oxyCODONE (ROXICODONE) 5 MG tablet        Oxycodone-Acetaminophen (PERCOCET PO) Take by mouth as needed         Social History     Tobacco Use     Smoking status: Former Smoker     Quit date: 10/1/2015     Years since quittin.3     Smokeless tobacco: Never Used   Substance Use Topics     Alcohol use: None     Drug use: None       Enmanuel MCKENZIE  2/3/2022  8:56 AM  "

## 2022-02-03 NOTE — LETTER
2/3/2022       RE: Diego Velarde  [address]  [place] [address]     Dear Colleague,    Thank you for referring your patient, Diego Velarde, to the [facility] UROLOGY CLINIC [place] at [facility]. Please see a copy of my visit note below.    Diego is a 76 year old who is being evaluated via a billable telephone visit.      What phone number would you like to be contacted at? [phone]  How would you like to obtain your AVS? MyChart  Phone call duration: 5 minutes    Pt here to follow-up ED.  Refilled injection, generic alprostadil 0.4mL of 50mcg/mL  3 back surgery in the last years, so he's not been as sexually active.  He recently tried alprostadil that had been in the refrigerator x 6 weeks, and this was only 80% effective.    Lab Results   Component Value Date    PSA 2.0 07/13/2020    PSA 2.09 02/22/2018        Discussed routine PSA screening is not required at this age.    A  ED  BPH- nocturia x 1.     Plan  - return to clinic 1 year PRN.  - discussed may titrate up on alprostadil to get better efficacy.  Advised keeping thawed alprostadil in the fridge < 30d for best potency of the medication.    5 min call, ending 9:55 AM       Again, thank you for allowing me to participate in the care of your patient.      Sincerely,    Niranjan Torres MD

## 2022-02-09 ENCOUNTER — LAB REQUISITION (OUTPATIENT)
Dept: LAB | Facility: CLINIC | Age: 77
End: 2022-02-09
Payer: MEDICARE

## 2022-02-09 DIAGNOSIS — E11.65 TYPE 2 DIABETES MELLITUS WITH HYPERGLYCEMIA (H): ICD-10-CM

## 2022-02-09 DIAGNOSIS — I10 ESSENTIAL (PRIMARY) HYPERTENSION: ICD-10-CM

## 2022-02-09 LAB
ANION GAP SERPL CALCULATED.3IONS-SCNC: 10 MMOL/L (ref 5–18)
BUN SERPL-MCNC: 15 MG/DL (ref 8–28)
CALCIUM SERPL-MCNC: 10.2 MG/DL (ref 8.5–10.5)
CHLORIDE BLD-SCNC: 102 MMOL/L (ref 98–107)
CO2 SERPL-SCNC: 25 MMOL/L (ref 22–31)
CREAT SERPL-MCNC: 1.02 MG/DL (ref 0.7–1.3)
CREAT UR-MCNC: 154 MG/DL
GFR SERPL CREATININE-BSD FRML MDRD: 77 ML/MIN/1.73M2
GLUCOSE BLD-MCNC: 181 MG/DL (ref 70–125)
MICROALBUMIN UR-MCNC: 21.3 MG/DL (ref 0–1.99)
MICROALBUMIN/CREAT UR: 138.3 MG/G CR
POTASSIUM BLD-SCNC: 5.3 MMOL/L (ref 3.5–5)
SODIUM SERPL-SCNC: 137 MMOL/L (ref 136–145)

## 2022-02-09 PROCEDURE — 82043 UR ALBUMIN QUANTITATIVE: CPT | Mod: ORL | Performed by: PHYSICIAN ASSISTANT

## 2022-02-09 PROCEDURE — 80048 BASIC METABOLIC PNL TOTAL CA: CPT | Mod: ORL | Performed by: PHYSICIAN ASSISTANT

## 2022-04-09 ENCOUNTER — HEALTH MAINTENANCE LETTER (OUTPATIENT)
Age: 77
End: 2022-04-09

## 2022-05-10 ENCOUNTER — LAB REQUISITION (OUTPATIENT)
Dept: LAB | Facility: CLINIC | Age: 77
End: 2022-05-10
Payer: MEDICARE

## 2022-05-10 DIAGNOSIS — E11.65 TYPE 2 DIABETES MELLITUS WITH HYPERGLYCEMIA (H): ICD-10-CM

## 2022-05-10 LAB
ANION GAP SERPL CALCULATED.3IONS-SCNC: 13 MMOL/L (ref 5–18)
BUN SERPL-MCNC: 19 MG/DL (ref 8–28)
CALCIUM SERPL-MCNC: 9.9 MG/DL (ref 8.5–10.5)
CHLORIDE BLD-SCNC: 102 MMOL/L (ref 98–107)
CO2 SERPL-SCNC: 23 MMOL/L (ref 22–31)
CREAT SERPL-MCNC: 1.12 MG/DL (ref 0.7–1.3)
GFR SERPL CREATININE-BSD FRML MDRD: 68 ML/MIN/1.73M2
GLUCOSE BLD-MCNC: 174 MG/DL (ref 70–125)
POTASSIUM BLD-SCNC: 4.6 MMOL/L (ref 3.5–5)
SODIUM SERPL-SCNC: 138 MMOL/L (ref 136–145)

## 2022-05-10 PROCEDURE — 80048 BASIC METABOLIC PNL TOTAL CA: CPT | Mod: ORL | Performed by: PHYSICIAN ASSISTANT

## 2022-06-04 ENCOUNTER — HEALTH MAINTENANCE LETTER (OUTPATIENT)
Age: 77
End: 2022-06-04

## 2022-08-08 ENCOUNTER — LAB REQUISITION (OUTPATIENT)
Dept: LAB | Facility: CLINIC | Age: 77
End: 2022-08-08
Payer: MEDICARE

## 2022-08-08 DIAGNOSIS — Z12.5 ENCOUNTER FOR SCREENING FOR MALIGNANT NEOPLASM OF PROSTATE: ICD-10-CM

## 2022-08-08 DIAGNOSIS — E78.00 PURE HYPERCHOLESTEROLEMIA, UNSPECIFIED: ICD-10-CM

## 2022-08-08 LAB
CHOLEST SERPL-MCNC: 142 MG/DL
HDLC SERPL-MCNC: 31 MG/DL
LDLC SERPL CALC-MCNC: ABNORMAL MG/DL
LDLC SERPL DIRECT ASSAY-MCNC: 43 MG/DL
NONHDLC SERPL-MCNC: 111 MG/DL
PSA SERPL-MCNC: 2.28 NG/ML (ref 0–6.5)
TRIGL SERPL-MCNC: 533 MG/DL

## 2022-08-08 PROCEDURE — 80061 LIPID PANEL: CPT | Mod: ORL | Performed by: PHYSICIAN ASSISTANT

## 2022-08-08 PROCEDURE — G0103 PSA SCREENING: HCPCS | Mod: ORL | Performed by: PHYSICIAN ASSISTANT

## 2022-08-08 PROCEDURE — 83721 ASSAY OF BLOOD LIPOPROTEIN: CPT | Mod: ORL | Performed by: PHYSICIAN ASSISTANT

## 2022-10-09 ENCOUNTER — HEALTH MAINTENANCE LETTER (OUTPATIENT)
Age: 77
End: 2022-10-09

## 2022-10-19 NOTE — TELEPHONE ENCOUNTER
Message from scheduling:  Hello,     Patient is no longer receiving care with us, he follows up with family care.     Thank you,   Tara     
hydrochlorothiazide (HYDRODIURIL) 25 MG tablet      Last Written Prescription Date:  11-15-19  Last Fill Quantity: 90,   # refills: 3  Last Office Visit : 12-3-19 ( RTC 6 M)  Future Office visit:  none    OUTSIDE LABS 6-12-20: [facility]    Routing refill request to provider for review/approval because:  Gap in refill  FYI--Past due BP check/documentation    Scheduling has been notified to contact the pt for appointment.        
no

## 2022-11-07 ENCOUNTER — LAB REQUISITION (OUTPATIENT)
Dept: LAB | Facility: CLINIC | Age: 77
End: 2022-11-07
Payer: MEDICARE

## 2022-11-07 DIAGNOSIS — E11.65 TYPE 2 DIABETES MELLITUS WITH HYPERGLYCEMIA (H): ICD-10-CM

## 2022-11-07 LAB
ANION GAP SERPL CALCULATED.3IONS-SCNC: 13 MMOL/L (ref 7–15)
BUN SERPL-MCNC: 17.9 MG/DL (ref 8–23)
CALCIUM SERPL-MCNC: 9.4 MG/DL (ref 8.8–10.2)
CHLORIDE SERPL-SCNC: 100 MMOL/L (ref 98–107)
CREAT SERPL-MCNC: 0.89 MG/DL (ref 0.67–1.17)
DEPRECATED HCO3 PLAS-SCNC: 23 MMOL/L (ref 22–29)
GFR SERPL CREATININE-BSD FRML MDRD: 89 ML/MIN/1.73M2
GLUCOSE SERPL-MCNC: 156 MG/DL (ref 70–99)
POTASSIUM SERPL-SCNC: 4.7 MMOL/L (ref 3.4–5.3)
SODIUM SERPL-SCNC: 136 MMOL/L (ref 136–145)

## 2022-11-07 PROCEDURE — 80048 BASIC METABOLIC PNL TOTAL CA: CPT | Mod: ORL | Performed by: PHYSICIAN ASSISTANT

## 2022-11-26 ENCOUNTER — HEALTH MAINTENANCE LETTER (OUTPATIENT)
Age: 77
End: 2022-11-26

## 2023-04-22 NOTE — NURSING NOTE
Capillary puncture performed for Hemoglobin A1C test. Patient tolerated well.   Pt to recall 2 main concepts from education (protein needs, fluid needs, vitamin and mineral needs)

## 2023-05-04 ENCOUNTER — LAB REQUISITION (OUTPATIENT)
Dept: LAB | Facility: CLINIC | Age: 78
End: 2023-05-04
Payer: MEDICARE

## 2023-05-04 DIAGNOSIS — E11.65 TYPE 2 DIABETES MELLITUS WITH HYPERGLYCEMIA (H): ICD-10-CM

## 2023-05-04 LAB
ANION GAP SERPL CALCULATED.3IONS-SCNC: 14 MMOL/L (ref 7–15)
BUN SERPL-MCNC: 21 MG/DL (ref 8–23)
CALCIUM SERPL-MCNC: 9.7 MG/DL (ref 8.8–10.2)
CHLORIDE SERPL-SCNC: 101 MMOL/L (ref 98–107)
CREAT SERPL-MCNC: 1.06 MG/DL (ref 0.67–1.17)
DEPRECATED HCO3 PLAS-SCNC: 22 MMOL/L (ref 22–29)
GFR SERPL CREATININE-BSD FRML MDRD: 72 ML/MIN/1.73M2
GLUCOSE SERPL-MCNC: 190 MG/DL (ref 70–99)
POTASSIUM SERPL-SCNC: 4.4 MMOL/L (ref 3.4–5.3)
SODIUM SERPL-SCNC: 137 MMOL/L (ref 136–145)

## 2023-05-04 PROCEDURE — 80048 BASIC METABOLIC PNL TOTAL CA: CPT | Mod: ORL | Performed by: PHYSICIAN ASSISTANT

## 2023-06-10 ENCOUNTER — HEALTH MAINTENANCE LETTER (OUTPATIENT)
Age: 78
End: 2023-06-10

## 2023-08-11 ENCOUNTER — LAB REQUISITION (OUTPATIENT)
Dept: LAB | Facility: CLINIC | Age: 78
End: 2023-08-11
Payer: MEDICARE

## 2023-08-11 DIAGNOSIS — I10 ESSENTIAL (PRIMARY) HYPERTENSION: ICD-10-CM

## 2023-08-11 DIAGNOSIS — E11.65 TYPE 2 DIABETES MELLITUS WITH HYPERGLYCEMIA (H): ICD-10-CM

## 2023-08-11 DIAGNOSIS — E78.00 PURE HYPERCHOLESTEROLEMIA, UNSPECIFIED: ICD-10-CM

## 2023-08-11 LAB
ALBUMIN SERPL BCG-MCNC: 4.5 G/DL (ref 3.5–5.2)
ALP SERPL-CCNC: 110 U/L (ref 40–129)
ALT SERPL W P-5'-P-CCNC: 21 U/L (ref 0–70)
ANION GAP SERPL CALCULATED.3IONS-SCNC: 12 MMOL/L (ref 7–15)
AST SERPL W P-5'-P-CCNC: 22 U/L (ref 0–45)
BILIRUB SERPL-MCNC: 0.3 MG/DL
BUN SERPL-MCNC: 17.3 MG/DL (ref 8–23)
CALCIUM SERPL-MCNC: 9.6 MG/DL (ref 8.8–10.2)
CHLORIDE SERPL-SCNC: 101 MMOL/L (ref 98–107)
CHOLEST SERPL-MCNC: 132 MG/DL
CREAT SERPL-MCNC: 0.94 MG/DL (ref 0.67–1.17)
CREAT UR-MCNC: 126 MG/DL
DEPRECATED HCO3 PLAS-SCNC: 24 MMOL/L (ref 22–29)
GFR SERPL CREATININE-BSD FRML MDRD: 83 ML/MIN/1.73M2
GLUCOSE SERPL-MCNC: 140 MG/DL (ref 70–99)
HDLC SERPL-MCNC: 30 MG/DL
LDLC SERPL CALC-MCNC: ABNORMAL MG/DL
LDLC SERPL DIRECT ASSAY-MCNC: 41 MG/DL
MICROALBUMIN UR-MCNC: 183 MG/L
MICROALBUMIN/CREAT UR: 145.24 MG/G CR (ref 0–17)
NONHDLC SERPL-MCNC: 102 MG/DL
POTASSIUM SERPL-SCNC: 5 MMOL/L (ref 3.4–5.3)
PROT SERPL-MCNC: 6.8 G/DL (ref 6.4–8.3)
SODIUM SERPL-SCNC: 137 MMOL/L (ref 136–145)
TRIGL SERPL-MCNC: 529 MG/DL

## 2023-08-11 PROCEDURE — 80053 COMPREHEN METABOLIC PANEL: CPT | Mod: ORL | Performed by: PHYSICIAN ASSISTANT

## 2023-08-11 PROCEDURE — 80061 LIPID PANEL: CPT | Mod: ORL | Performed by: PHYSICIAN ASSISTANT

## 2023-08-11 PROCEDURE — 83721 ASSAY OF BLOOD LIPOPROTEIN: CPT | Mod: ORL | Performed by: PHYSICIAN ASSISTANT

## 2023-08-11 PROCEDURE — 82570 ASSAY OF URINE CREATININE: CPT | Mod: ORL | Performed by: PHYSICIAN ASSISTANT

## 2023-08-29 DIAGNOSIS — N52.9 ERECTILE DYSFUNCTION, UNSPECIFIED ERECTILE DYSFUNCTION TYPE: Primary | ICD-10-CM

## 2023-08-29 RX ORDER — ALPROSTADIL 20 UG/.5ML
20 INJECTION, POWDER, LYOPHILIZED, FOR SOLUTION INTRACAVERNOUS PRN
Qty: 2 KIT | Refills: 3 | Status: SHIPPED | OUTPATIENT
Start: 2023-08-29

## 2023-08-31 DIAGNOSIS — N52.8 OTHER MALE ERECTILE DYSFUNCTION: Primary | ICD-10-CM

## 2023-09-29 DIAGNOSIS — N52.9 ERECTILE DYSFUNCTION, UNSPECIFIED ERECTILE DYSFUNCTION TYPE: ICD-10-CM

## 2023-10-02 ENCOUNTER — TELEPHONE (OUTPATIENT)
Dept: UROLOGY | Facility: CLINIC | Age: 78
End: 2023-10-02

## 2023-10-02 NOTE — TELEPHONE ENCOUNTER
[facility] Call Center    Phone Message    May a detailed message be left on voicemail: yes     Reason for Call: Other: Pt calling regarding medication     alprostadil (CAVERJECT IMPULSE) 20 MCG  , prescribed by Brian. Pt states he was speaking with care team member Kasia regarding having a refill sent. Pt is looking for a update and would like a call back. Please call pt     Action Taken: Message routed to:  Other: Uro    Travel Screening: Not Applicable

## 2023-10-03 NOTE — TELEPHONE ENCOUNTER
COMPOUNDED NON-CONTROLLED SUBSTANCE   Last Written Prescription Date:  12/8/21  Last Fill Quantity: 5ML,   # refills: 11  Last Office Visit : 2/2/22  Future Office visit:  NONE  Routing refill request to provider for review/approval because:  Drug not on the refill protocol /controlled substance  Pt outside of RTC timeframe  / OVER DUE

## 2023-10-04 NOTE — TELEPHONE ENCOUNTER
Patient last visit 8/31/23 by Brian Boone for refill    Kasia Gustafson, RN, BSN  Care Coordinator Urology  [facility] [place]  Urology Clinic  [phone]

## 2024-01-06 ENCOUNTER — HEALTH MAINTENANCE LETTER (OUTPATIENT)
Age: 79
End: 2024-01-06

## 2024-03-18 ENCOUNTER — LAB REQUISITION (OUTPATIENT)
Dept: LAB | Facility: CLINIC | Age: 79
End: 2024-03-18
Payer: MEDICARE

## 2024-03-18 DIAGNOSIS — E11.65 TYPE 2 DIABETES MELLITUS WITH HYPERGLYCEMIA (H): ICD-10-CM

## 2024-03-18 LAB
ANION GAP SERPL CALCULATED.3IONS-SCNC: 11 MMOL/L (ref 7–15)
BUN SERPL-MCNC: 15.6 MG/DL (ref 8–23)
CALCIUM SERPL-MCNC: 9.1 MG/DL (ref 8.8–10.2)
CHLORIDE SERPL-SCNC: 102 MMOL/L (ref 98–107)
CREAT SERPL-MCNC: 0.91 MG/DL (ref 0.67–1.17)
DEPRECATED HCO3 PLAS-SCNC: 23 MMOL/L (ref 22–29)
EGFRCR SERPLBLD CKD-EPI 2021: 87 ML/MIN/1.73M2
GLUCOSE SERPL-MCNC: 190 MG/DL (ref 70–99)
POTASSIUM SERPL-SCNC: 4.9 MMOL/L (ref 3.4–5.3)
SODIUM SERPL-SCNC: 136 MMOL/L (ref 135–145)

## 2024-03-18 PROCEDURE — 80048 BASIC METABOLIC PNL TOTAL CA: CPT | Mod: ORL | Performed by: PHYSICIAN ASSISTANT

## 2024-08-03 ENCOUNTER — HEALTH MAINTENANCE LETTER (OUTPATIENT)
Age: 79
End: 2024-08-03

## 2024-08-20 ENCOUNTER — TRANSCRIBE ORDERS (OUTPATIENT)
Dept: OTHER | Age: 79
End: 2024-08-20

## 2024-08-20 DIAGNOSIS — Z98.1 S/P LUMBAR SPINAL FUSION: Primary | ICD-10-CM

## 2024-09-23 ENCOUNTER — LAB REQUISITION (OUTPATIENT)
Dept: LAB | Facility: CLINIC | Age: 79
End: 2024-09-23
Payer: MEDICARE

## 2024-09-23 DIAGNOSIS — E78.00 PURE HYPERCHOLESTEROLEMIA, UNSPECIFIED: ICD-10-CM

## 2024-09-23 DIAGNOSIS — E11.65 TYPE 2 DIABETES MELLITUS WITH HYPERGLYCEMIA (H): ICD-10-CM

## 2024-09-23 LAB
ALBUMIN SERPL BCG-MCNC: 4.3 G/DL (ref 3.5–5.2)
ALP SERPL-CCNC: 154 U/L (ref 40–150)
ALT SERPL W P-5'-P-CCNC: 11 U/L (ref 0–70)
ANION GAP SERPL CALCULATED.3IONS-SCNC: 13 MMOL/L (ref 7–15)
AST SERPL W P-5'-P-CCNC: 20 U/L (ref 0–45)
BILIRUB SERPL-MCNC: 0.3 MG/DL
BUN SERPL-MCNC: 17 MG/DL (ref 8–23)
CALCIUM SERPL-MCNC: 9.3 MG/DL (ref 8.8–10.4)
CHLORIDE SERPL-SCNC: 99 MMOL/L (ref 98–107)
CHOLEST SERPL-MCNC: 134 MG/DL
CREAT SERPL-MCNC: 0.89 MG/DL (ref 0.67–1.17)
CREAT UR-MCNC: 61.6 MG/DL
EGFRCR SERPLBLD CKD-EPI 2021: 88 ML/MIN/1.73M2
FASTING STATUS PATIENT QL REPORTED: ABNORMAL
FASTING STATUS PATIENT QL REPORTED: ABNORMAL
GLUCOSE SERPL-MCNC: 177 MG/DL (ref 70–99)
HCO3 SERPL-SCNC: 23 MMOL/L (ref 22–29)
HDLC SERPL-MCNC: 31 MG/DL
LDLC SERPL CALC-MCNC: ABNORMAL MG/DL
LDLC SERPL DIRECT ASSAY-MCNC: 42 MG/DL
MICROALBUMIN UR-MCNC: 237 MG/L
MICROALBUMIN/CREAT UR: 384.74 MG/G CR (ref 0–17)
NONHDLC SERPL-MCNC: 103 MG/DL
POTASSIUM SERPL-SCNC: 4.1 MMOL/L (ref 3.4–5.3)
PROT SERPL-MCNC: 7.6 G/DL (ref 6.4–8.3)
SODIUM SERPL-SCNC: 135 MMOL/L (ref 135–145)
TRIGL SERPL-MCNC: 421 MG/DL

## 2024-09-23 PROCEDURE — 80053 COMPREHEN METABOLIC PANEL: CPT | Mod: ORL | Performed by: PHYSICIAN ASSISTANT

## 2024-09-23 PROCEDURE — 82043 UR ALBUMIN QUANTITATIVE: CPT | Mod: ORL | Performed by: PHYSICIAN ASSISTANT

## 2024-09-23 PROCEDURE — 83721 ASSAY OF BLOOD LIPOPROTEIN: CPT | Mod: ORL | Performed by: PHYSICIAN ASSISTANT

## 2024-09-23 PROCEDURE — 80061 LIPID PANEL: CPT | Mod: ORL | Performed by: PHYSICIAN ASSISTANT

## 2025-01-25 ENCOUNTER — HEALTH MAINTENANCE LETTER (OUTPATIENT)
Age: 80
End: 2025-01-25

## 2025-02-22 ENCOUNTER — HEALTH MAINTENANCE LETTER (OUTPATIENT)
Age: 80
End: 2025-02-22

## 2025-03-16 NOTE — PROGRESS NOTES
Dear Bill,     Your testosterone levels were normal.  We do not plan make any dose changes.  Continue Cialis for your symptoms as needed.  Continue testosterone.    With Best Regards,   Sammy Alonso MD  Endocrinology Service.   Yes

## 2025-03-18 ENCOUNTER — LAB REQUISITION (OUTPATIENT)
Dept: LAB | Facility: CLINIC | Age: 80
End: 2025-03-18
Payer: MEDICARE

## 2025-03-18 DIAGNOSIS — E11.65 TYPE 2 DIABETES MELLITUS WITH HYPERGLYCEMIA (H): ICD-10-CM

## 2025-03-18 PROCEDURE — 80048 BASIC METABOLIC PNL TOTAL CA: CPT | Mod: ORL | Performed by: PHYSICIAN ASSISTANT

## 2025-03-19 LAB
ANION GAP SERPL CALCULATED.3IONS-SCNC: 10 MMOL/L (ref 7–15)
BUN SERPL-MCNC: 12.8 MG/DL (ref 8–23)
CALCIUM SERPL-MCNC: 9.2 MG/DL (ref 8.8–10.4)
CHLORIDE SERPL-SCNC: 101 MMOL/L (ref 98–107)
CREAT SERPL-MCNC: 0.88 MG/DL (ref 0.67–1.17)
EGFRCR SERPLBLD CKD-EPI 2021: 88 ML/MIN/1.73M2
GLUCOSE SERPL-MCNC: 136 MG/DL (ref 70–99)
HCO3 SERPL-SCNC: 26 MMOL/L (ref 22–29)
POTASSIUM SERPL-SCNC: 4.5 MMOL/L (ref 3.4–5.3)
SODIUM SERPL-SCNC: 137 MMOL/L (ref 135–145)